# Patient Record
Sex: MALE | Race: OTHER | NOT HISPANIC OR LATINO | URBAN - METROPOLITAN AREA
[De-identification: names, ages, dates, MRNs, and addresses within clinical notes are randomized per-mention and may not be internally consistent; named-entity substitution may affect disease eponyms.]

---

## 2022-05-12 ENCOUNTER — EMERGENCY (EMERGENCY)
Facility: HOSPITAL | Age: 47
LOS: 1 days | Discharge: HOME | End: 2022-05-12
Attending: EMERGENCY MEDICINE
Payer: COMMERCIAL

## 2022-05-12 VITALS
RESPIRATION RATE: 16 BRPM | OXYGEN SATURATION: 99 % | DIASTOLIC BLOOD PRESSURE: 87 MMHG | TEMPERATURE: 99 F | SYSTOLIC BLOOD PRESSURE: 143 MMHG | HEART RATE: 87 BPM

## 2022-05-12 DIAGNOSIS — T78.2XXA ANAPHYLACTIC SHOCK, UNSPECIFIED, INITIAL ENCOUNTER: ICD-10-CM

## 2022-05-12 PROCEDURE — 96372 THER/PROPH/DIAG INJ SC/IM: CPT | Mod: XU

## 2022-05-12 PROCEDURE — 31525 DX LARYNGOSCOPY EXCL NB: CPT

## 2022-05-12 PROCEDURE — 96375 TX/PRO/DX INJ NEW DRUG ADDON: CPT

## 2022-05-12 PROCEDURE — 99291 CRITICAL CARE FIRST HOUR: CPT

## 2022-05-12 PROCEDURE — 94640 AIRWAY INHALATION TREATMENT: CPT

## 2022-05-12 PROCEDURE — 99284 EMERGENCY DEPT VISIT MOD MDM: CPT | Mod: 25

## 2022-05-12 PROCEDURE — 96374 THER/PROPH/DIAG INJ IV PUSH: CPT

## 2022-05-12 RX ORDER — ALBUTEROL 90 UG/1
2.5 AEROSOL, METERED ORAL ONCE
Refills: 0 | Status: COMPLETED | OUTPATIENT
Start: 2022-05-12 | End: 2022-05-12

## 2022-05-12 RX ORDER — DEXAMETHASONE 0.5 MG/5ML
10 ELIXIR ORAL ONCE
Refills: 0 | Status: DISCONTINUED | OUTPATIENT
Start: 2022-05-12 | End: 2022-05-12

## 2022-05-12 RX ORDER — FAMOTIDINE 10 MG/ML
20 INJECTION INTRAVENOUS ONCE
Refills: 0 | Status: COMPLETED | OUTPATIENT
Start: 2022-05-12 | End: 2022-05-12

## 2022-05-12 RX ORDER — DIPHENHYDRAMINE HCL 50 MG
50 CAPSULE ORAL ONCE
Refills: 0 | Status: COMPLETED | OUTPATIENT
Start: 2022-05-12 | End: 2022-05-12

## 2022-05-12 RX ORDER — DEXAMETHASONE 0.5 MG/5ML
10 ELIXIR ORAL ONCE
Refills: 0 | Status: COMPLETED | OUTPATIENT
Start: 2022-05-12 | End: 2022-05-12

## 2022-05-12 RX ORDER — EPINEPHRINE 0.3 MG/.3ML
0.3 INJECTION INTRAMUSCULAR; SUBCUTANEOUS ONCE
Refills: 0 | Status: COMPLETED | OUTPATIENT
Start: 2022-05-12 | End: 2022-05-12

## 2022-05-12 RX ORDER — SODIUM CHLORIDE 9 MG/ML
1000 INJECTION INTRAMUSCULAR; INTRAVENOUS; SUBCUTANEOUS ONCE
Refills: 0 | Status: COMPLETED | OUTPATIENT
Start: 2022-05-12 | End: 2022-05-12

## 2022-05-12 RX ADMIN — Medication 102 MILLIGRAM(S): at 22:11

## 2022-05-12 RX ADMIN — Medication 50 MILLIGRAM(S): at 22:12

## 2022-05-12 RX ADMIN — FAMOTIDINE 20 MILLIGRAM(S): 10 INJECTION INTRAVENOUS at 22:11

## 2022-05-12 RX ADMIN — EPINEPHRINE 0.3 MILLIGRAM(S): 0.3 INJECTION INTRAMUSCULAR; SUBCUTANEOUS at 22:12

## 2022-05-12 RX ADMIN — SODIUM CHLORIDE 2000 MILLILITER(S): 9 INJECTION INTRAMUSCULAR; INTRAVENOUS; SUBCUTANEOUS at 22:15

## 2022-05-12 RX ADMIN — ALBUTEROL 2.5 MILLIGRAM(S): 90 AEROSOL, METERED ORAL at 22:16

## 2022-05-12 NOTE — ED ADULT TRIAGE NOTE - CHIEF COMPLAINT QUOTE
ate tree nuts and eyes are swollen this happened at 1pm no known allergies, took benadryl at 230, no diff breathing

## 2022-05-12 NOTE — ED PROVIDER NOTE - PATIENT PORTAL LINK FT
You can access the FollowMyHealth Patient Portal offered by [facility] by registering at the following website: [email or web address] By joining [facility]’s FollowMyHealth portal, you will also be able to view your health information using other applications (apps) compatible with our system.

## 2022-05-12 NOTE — ED PROVIDER NOTE - OBJECTIVE STATEMENT
46y M with no pertinent PMHx presents to the ED c/o allergic reaction a/w swollen eyes, rash, itching, and difficulty breathing s/p eating tree nuts. Pt said that the symptoms came suddenly, but worsened one hour after. Pt was drinking a homemade drink while eating tree nuts. Pt took benadryl at 1430 with some relief of rash, itching, difficulty breathing. Pt takes 3 allergy shots every 2 weeks. Denies similar event in the past despite eating nuts in the past. Denies CP, cough, abd pain. Denies hematochezia and melena. Denies trouble walking. Denies paresthesias. 46y M with no pertinent PMHx presents to the ED c/o allergic reaction a/w swollen eyes, diffuse urticarial rash, itching, throat tightness and difficulty breathing/wheezing s/p eating tree nuts. Pt said that the symptoms came suddenly at ~1pm today, drinking a homemade drink while eating tree nuts, but worsened one hour after around 2pm. Pt took benadryl at 1430 with some relief of rash, itching, difficulty breathing. Did have abdominal cramping pain and 3 episodes of diarrhea. Pt takes 3 allergy shots every 2 weeks for environmental allergies. Denies similar event in the past despite eating nuts in the past. Denies CP, cough, abd pain. Denies hematochezia and melena. Denies trouble walking. Denies paresthesias.

## 2022-05-12 NOTE — ED PROVIDER NOTE - NSFOLLOWUPINSTRUCTIONS_ED_ALL_ED_FT
[id number]  *More detailed information regarding your visit and discharge can be found by reviewing this packet.*  -----------------------------    Your diagnosis this visit was: anaphylaxis (severe allergic reaction)     Continue to take benadryl 50mg every 6 to 8 hours as needed for itching, rash or swelling. You can take Pepcid 20mg up to every 6 hours for additional relief of allergic symptoms.     From this ED visit you were prescribed: epi pen to be taken if you experience any oral swelling, trouble breathing, vomiting or abd pain associated with an allergic reaction. If you administer your epi pen you should come immediately to the ER.     We recommend you follow up with: your allergist in 1-3 days.     Please return to the Emergency Department if you experience any of the following symptoms:  - Shortness of breath or trouble breathing  - Pressure, pain, or tightness in the chest  - facial swelling, oral swelling, vomiting  - Face drooping, arm weakness or speech difficulty,  - Persistent or severe vomiting  - Head injury or loss of consciousness  - Nonstop bleeding or an open wound

## 2022-05-12 NOTE — CONSULT NOTE ADULT - SUBJECTIVE AND OBJECTIVE BOX
CC: Anaphylaxis    HPI: 46y M with no pertinent PMHx presents to the ED c/o allergic reaction a/w swollen eyes, diffuse urticarial rash, itching, throat tightness and difficulty breathing/wheezing s/p eating tree nuts. Pt said that the symptoms came suddenly at ~1pm today, drinking a homemade drink while eating tree nuts, but worsened one hour after around 2pm. Pt took benadryl at 1430 with some relief of rash, itching, difficulty breathing. Did have abdominal cramping pain and 3 episodes of diarrhea. Pt takes 3 allergy shots every 2 weeks for environmental allergies. Denies similar event in the past despite eating nuts in the past. Denies CP, cough, abd pain. Denies hematochezia and melena. Denies trouble walking. Denies paresthesias.        PAST MEDICAL & SURGICAL HISTORY:  No pertinent past medical history      No significant past surgical history        Allergies    dust (Unknown)  No Known Drug Allergies  pollen, grass (Unknown)    Intolerances      MEDICATIONS  (STANDING):    MEDICATIONS  (PRN):      Social History: nonsmoker     Family history: no pertinent family history     ROS:   ENT: all negative except as noted in HPI   CV: denies palpitations  Pulm: denies SOB, cough, hemoptysis  : denies pertinent urinary symptoms, urgency  Neuro: denies numbness/tingling, loss of sensation  Psych: denies anxiety  MS: denies muscle weakness, instability  Heme: denies easy bruising or bleeding  Endo: denies heat/cold intolerance, excessive sweating  Vascular: denies LE edema    Vital Signs Last 24 Hrs  T(C): 37.1 (12 May 2022 19:12), Max: 37.1 (12 May 2022 19:12)  T(F): 98.7 (12 May 2022 19:12), Max: 98.7 (12 May 2022 19:12)  HR: 87 (12 May 2022 19:12) (87 - 87)  BP: 143/87 (12 May 2022 19:12) (143/87 - 143/87)  BP(mean): --  RR: 16 (12 May 2022 19:12) (16 - 16)  SpO2: 99% (12 May 2022 19:12) (99% - 99%)              PHYSICAL EXAM:  Gen: NAD  Skin: No rashes, bruises, or lesions  Head: Normocephalic, Atraumatic  Face: no edema, erythema, or fluctuance. Parotid glands soft without mass  Eyes:  periorbital edema BL lids  Nose: Nares bilaterally patent, no discharge  Mouth: No Stridor / Drooling / Trismus.  Mucosa moist  Neck: Flat, supple, no lymphadenopathy, trachea midline, no masses  Lymphatic: No lymphadenopathy  Resp: breathing easily, no stridor  CV: no peripheral edema/cyanosis  GI: nondistended   Peripheral vascular: no JVD or edema  Neuro: facial nerve intact, no facial droop        Fiberoptic Indirect laryngoscopy:  (Scope #2 used)  Reason for Laryngoscopy: upper airway eval     Nasopharynx, oropharynx, and hypopharynx clear, no bleeding. Tongue base, posterior pharyngeal wall, vallecula, epiglottis, and subglottis appear normal. No erythema, edema, pooling of secretions, masses or lesions. Airway patent, no foreign body visualized. No glottic/supraglottic edema. True vocal cords, arytenoids, vestibular folds, ventricles, pyriform sinuses, and aryepiglottic folds appear normal bilaterally. Vocal cords mobile with good contact b/l.

## 2022-05-12 NOTE — ED PROVIDER NOTE - PROGRESS NOTE DETAILS
Dr. Otoole:  Pt reporst sx improvement. Will cont to monitor. Pt would like to be DC. States feels improved. Clinically dec facial swelling, scoped by ENT w patent upper airway, lungs cta. Sending epi pen to pharmacy. Advised to LYDIA curtis his allergist. Made aware of potential for delayed phase anaphylaxis and reviewed what to monitor for.

## 2022-05-12 NOTE — ED PROVIDER NOTE - CLINICAL SUMMARY MEDICAL DECISION MAKING FREE TEXT BOX
46M hx of environmental allergies here with allergic reaction c/w anaphylaxis w facial swelling, oropharyngeal swelling, wheezing, abd cramps and diarrhea. Will tx as such.  Place on tele. Observe for 4-6 hours. ENT to see.

## 2022-05-12 NOTE — ED ADULT NURSE NOTE - OBJECTIVE STATEMENT
46y male A&Ox4  pmhx of deviated septum, seasonal allergies gets allergie shots [id number]. pt states he was already having his normal seasonal allergies this morning due to being outside and was experiencing stuffy nose, watery eyes, difficulty breathing and this afternoon around 1pm he ate tree nuts and by 2 pm his eyes became very swollen, at that point it was even more difficult to breath, and he developed a generalized rash/ichyness so he ended up taking 2x25mg tablets of benadryl at 3pm. currently his breathing has improved, rash/itchyness has gone away, but he continues to suffer with swollen eyes and nasal congestion. denies SOB, chest pain, dizziness, headache, NV, abd pain, urinary symptoms, AMS. pt states he  had 3episodes of diarrhea after taking benadryl.

## 2022-05-12 NOTE — CONSULT NOTE ADULT - ASSESSMENT
47 y/o M c/o allergic reaction a/w swollen eyes, diffuse urticarial rash, itching, throat tightness and difficulty breathing/wheezing s/p eating tree nuts. ENT consulted for upper airway evaluation. Laryngoscopy done at bedside, airway patent. No edema noted.

## 2022-05-12 NOTE — ED PROVIDER NOTE - PHYSICAL EXAMINATION
Gen: WNWD NAD  HEENT: NCAT PERRL EOMI periorbital edema BL lids pharyngeal erythema and edema to uvula and tonsils   Neck: supple no LAD   CV: RRR, no murmur  Lung: CTA BL +wheezing bl bases L> R   Abd: +BS soft NTND  Skin: warm dry intact no rashes  Ext: wwp, palp pulses, FROMx4, no cce  Neuro: A&Ox3, CN grossly intact, sensation intact, motor 5/5 throughout

## 2022-05-13 VITALS
SYSTOLIC BLOOD PRESSURE: 136 MMHG | OXYGEN SATURATION: 98 % | HEART RATE: 75 BPM | TEMPERATURE: 98 F | DIASTOLIC BLOOD PRESSURE: 82 MMHG | RESPIRATION RATE: 16 BRPM

## 2022-05-13 RX ORDER — EPINEPHRINE 0.3 MG/.3ML
0.3 INJECTION INTRAMUSCULAR; SUBCUTANEOUS
Qty: 1 | Refills: 1
Start: 2022-05-13

## 2022-05-13 RX ORDER — ALBUTEROL 90 UG/1
2 AEROSOL, METERED ORAL
Qty: 1 | Refills: 0
Start: 2022-05-13

## 2025-04-21 ENCOUNTER — INPATIENT (INPATIENT)
Facility: HOSPITAL | Age: 50
LOS: 2 days | Discharge: HOME | End: 2025-04-24
Attending: STUDENT IN AN ORGANIZED HEALTH CARE EDUCATION/TRAINING PROGRAM | Admitting: INTERNAL MEDICINE
Payer: COMMERCIAL

## 2025-04-21 VITALS
DIASTOLIC BLOOD PRESSURE: 117 MMHG | RESPIRATION RATE: 19 BRPM | HEART RATE: 88 BPM | SYSTOLIC BLOOD PRESSURE: 208 MMHG | TEMPERATURE: 98 F | OXYGEN SATURATION: 96 %

## 2025-04-21 LAB
APTT BLD: 31.2 SEC — SIGNIFICANT CHANGE UP (ref 24.5–35.6)
BASOPHILS # BLD AUTO: 0.08 K/UL — SIGNIFICANT CHANGE UP (ref 0–0.2)
BASOPHILS NFR BLD AUTO: 1.1 % — SIGNIFICANT CHANGE UP (ref 0–2)
BLOOD GAS VENOUS COMPREHENSIVE RESULT: SIGNIFICANT CHANGE UP
EOSINOPHIL # BLD AUTO: 0.53 K/UL — HIGH (ref 0–0.5)
EOSINOPHIL NFR BLD AUTO: 7.6 % — HIGH (ref 0–6)
HCT VFR BLD CALC: 37.5 % — LOW (ref 39–50)
HGB BLD-MCNC: 12.6 G/DL — LOW (ref 13–17)
IANC: 2.83 K/UL — SIGNIFICANT CHANGE UP (ref 1.8–7.4)
IMM GRANULOCYTES NFR BLD AUTO: 0.1 % — SIGNIFICANT CHANGE UP (ref 0–0.9)
INR BLD: 0.94 RATIO — SIGNIFICANT CHANGE UP (ref 0.85–1.16)
LYMPHOCYTES # BLD AUTO: 2.87 K/UL — SIGNIFICANT CHANGE UP (ref 1–3.3)
LYMPHOCYTES # BLD AUTO: 41.2 % — SIGNIFICANT CHANGE UP (ref 13–44)
MCHC RBC-ENTMCNC: 25.5 PG — LOW (ref 27–34)
MCHC RBC-ENTMCNC: 33.6 G/DL — SIGNIFICANT CHANGE UP (ref 32–36)
MCV RBC AUTO: 75.8 FL — LOW (ref 80–100)
MONOCYTES # BLD AUTO: 0.64 K/UL — SIGNIFICANT CHANGE UP (ref 0–0.9)
MONOCYTES NFR BLD AUTO: 9.2 % — SIGNIFICANT CHANGE UP (ref 2–14)
NEUTROPHILS # BLD AUTO: 2.83 K/UL — SIGNIFICANT CHANGE UP (ref 1.8–7.4)
NEUTROPHILS NFR BLD AUTO: 40.8 % — LOW (ref 43–77)
NRBC # BLD AUTO: 0 K/UL — SIGNIFICANT CHANGE UP (ref 0–0)
NRBC # FLD: 0 K/UL — SIGNIFICANT CHANGE UP (ref 0–0)
NRBC BLD AUTO-RTO: 0 /100 WBCS — SIGNIFICANT CHANGE UP (ref 0–0)
PLATELET # BLD AUTO: 180 K/UL — SIGNIFICANT CHANGE UP (ref 150–400)
PROTHROM AB SERPL-ACNC: 10.9 SEC — SIGNIFICANT CHANGE UP (ref 9.9–13.4)
RBC # BLD: 4.95 M/UL — SIGNIFICANT CHANGE UP (ref 4.2–5.8)
RBC # FLD: 13.7 % — SIGNIFICANT CHANGE UP (ref 10.3–14.5)
WBC # BLD: 6.96 K/UL — SIGNIFICANT CHANGE UP (ref 3.8–10.5)
WBC # FLD AUTO: 6.96 K/UL — SIGNIFICANT CHANGE UP (ref 3.8–10.5)

## 2025-04-21 PROCEDURE — 70496 CT ANGIOGRAPHY HEAD: CPT | Mod: 26

## 2025-04-21 PROCEDURE — 70498 CT ANGIOGRAPHY NECK: CPT | Mod: 26

## 2025-04-21 PROCEDURE — 99291 CRITICAL CARE FIRST HOUR: CPT

## 2025-04-21 PROCEDURE — 70450 CT HEAD/BRAIN W/O DYE: CPT | Mod: 26,59

## 2025-04-21 PROCEDURE — 0042T: CPT

## 2025-04-21 RX ORDER — LABETALOL HYDROCHLORIDE 200 MG/1
10 TABLET, FILM COATED ORAL ONCE
Refills: 0 | Status: COMPLETED | OUTPATIENT
Start: 2025-04-21 | End: 2025-04-21

## 2025-04-21 RX ORDER — TENECTEPLASE 50 MG
23 KIT INTRAVENOUS ONCE
Refills: 0 | Status: COMPLETED | OUTPATIENT
Start: 2025-04-21 | End: 2025-04-21

## 2025-04-21 RX ADMIN — LABETALOL HYDROCHLORIDE 10 MILLIGRAM(S): 200 TABLET, FILM COATED ORAL at 23:24

## 2025-04-21 RX ADMIN — Medication 10 MILLILITER(S): at 23:00

## 2025-04-21 RX ADMIN — LABETALOL HYDROCHLORIDE 10 MILLIGRAM(S): 200 TABLET, FILM COATED ORAL at 23:00

## 2025-04-21 RX ADMIN — TENECTEPLASE 3312 MILLIGRAM(S): KIT at 23:43

## 2025-04-21 NOTE — ED PROVIDER NOTE - PROGRESS NOTE DETAILS
MD Cara: Pt's BP improved to <185/110 after 10 mg labetalol IVP x 2. Cardene drip also started. Pt given TNK at 11:43 PM. Called MICU for admission

## 2025-04-21 NOTE — STROKE CODE NOTE - NIH STROKE SCALE: 1C. LOC COMMANDS, QM
Duplicate request.    Joel Carrington RN....1/13/2021 11:42 AM     
(0) Performs both tasks correctly

## 2025-04-21 NOTE — ED PROVIDER NOTE - OBJECTIVE STATEMENT
50 yo M with no known past medical history who presents to the ED c/o L arm/leg weakness and numbness. Symptoms initially began around 6 PM this evening, and then resolved around 8 pm. At 9:30 PM the symptoms suddenly returned and pt was unable to ambulate secondary to weakness in the L leg so wife called EMS. Pt denies headache, neck pain/stiffness, visual changes, chest pain, SOB, difficulty speaking.

## 2025-04-21 NOTE — ED ADULT TRIAGE NOTE - CHIEF COMPLAINT QUOTE
code stroke notification. Left sided weakness and numbness since 6pm. Left arm and leg drift noted. Blood pressure elevated. denies pmhx

## 2025-04-21 NOTE — ED PROVIDER NOTE - CLINICAL SUMMARY MEDICAL DECISION MAKING FREE TEXT BOX
48 yo M with no known past medical history who presents to the ED with L arm/leg weakness and numbness that initially began around 6 PM this evening, resolved, and then returned around 9:30 PM. On exam in the ED pt with L sided pronator drift, and difficulty with finger to nose on the L. Code stroke was called from triage, pt taken directly to CT scan and pt seen by me and neurology outside of CT. Presentation concerning for stroke. BP is elevated which is the pt's only contraindication to TNK. Will treat BP with labetalol and give TNK if BP improves to <185/115.

## 2025-04-22 PROBLEM — Z78.9 OTHER SPECIFIED HEALTH STATUS: Chronic | Status: ACTIVE | Noted: 2022-05-12

## 2025-04-22 LAB
A1C WITH ESTIMATED AVERAGE GLUCOSE RESULT: 6.3 % — HIGH (ref 4–5.6)
ALBUMIN SERPL ELPH-MCNC: 4.2 G/DL — SIGNIFICANT CHANGE UP (ref 3.3–5)
ALBUMIN SERPL ELPH-MCNC: 4.3 G/DL — SIGNIFICANT CHANGE UP (ref 3.3–5)
ALP SERPL-CCNC: 71 U/L — SIGNIFICANT CHANGE UP (ref 40–120)
ALP SERPL-CCNC: 77 U/L — SIGNIFICANT CHANGE UP (ref 40–120)
ALT FLD-CCNC: 24 U/L — SIGNIFICANT CHANGE UP (ref 4–41)
ALT FLD-CCNC: 27 U/L — SIGNIFICANT CHANGE UP (ref 4–41)
ANION GAP SERPL CALC-SCNC: 13 MMOL/L — SIGNIFICANT CHANGE UP (ref 7–14)
ANION GAP SERPL CALC-SCNC: 14 MMOL/L — SIGNIFICANT CHANGE UP (ref 7–14)
AST SERPL-CCNC: 24 U/L — SIGNIFICANT CHANGE UP (ref 4–40)
AST SERPL-CCNC: 24 U/L — SIGNIFICANT CHANGE UP (ref 4–40)
BASOPHILS # BLD AUTO: 0.08 K/UL — SIGNIFICANT CHANGE UP (ref 0–0.2)
BASOPHILS NFR BLD AUTO: 0.9 % — SIGNIFICANT CHANGE UP (ref 0–2)
BILIRUB SERPL-MCNC: 0.4 MG/DL — SIGNIFICANT CHANGE UP (ref 0.2–1.2)
BILIRUB SERPL-MCNC: 0.4 MG/DL — SIGNIFICANT CHANGE UP (ref 0.2–1.2)
BUN SERPL-MCNC: 14 MG/DL — SIGNIFICANT CHANGE UP (ref 7–23)
BUN SERPL-MCNC: 18 MG/DL — SIGNIFICANT CHANGE UP (ref 7–23)
CALCIUM SERPL-MCNC: 9.2 MG/DL — SIGNIFICANT CHANGE UP (ref 8.4–10.5)
CALCIUM SERPL-MCNC: 9.4 MG/DL — SIGNIFICANT CHANGE UP (ref 8.4–10.5)
CHLORIDE SERPL-SCNC: 101 MMOL/L — SIGNIFICANT CHANGE UP (ref 98–107)
CHLORIDE SERPL-SCNC: 99 MMOL/L — SIGNIFICANT CHANGE UP (ref 98–107)
CHOLEST SERPL-MCNC: 235 MG/DL — HIGH
CO2 SERPL-SCNC: 24 MMOL/L — SIGNIFICANT CHANGE UP (ref 22–31)
CO2 SERPL-SCNC: 24 MMOL/L — SIGNIFICANT CHANGE UP (ref 22–31)
CREAT SERPL-MCNC: 0.87 MG/DL — SIGNIFICANT CHANGE UP (ref 0.5–1.3)
CREAT SERPL-MCNC: 1.15 MG/DL — SIGNIFICANT CHANGE UP (ref 0.5–1.3)
EGFR: 104 ML/MIN/1.73M2 — SIGNIFICANT CHANGE UP
EGFR: 104 ML/MIN/1.73M2 — SIGNIFICANT CHANGE UP
EGFR: 77 ML/MIN/1.73M2 — SIGNIFICANT CHANGE UP
EGFR: 77 ML/MIN/1.73M2 — SIGNIFICANT CHANGE UP
EOSINOPHIL # BLD AUTO: 0.42 K/UL — SIGNIFICANT CHANGE UP (ref 0–0.5)
EOSINOPHIL NFR BLD AUTO: 5 % — SIGNIFICANT CHANGE UP (ref 0–6)
ESTIMATED AVERAGE GLUCOSE: 134 — SIGNIFICANT CHANGE UP
GLUCOSE SERPL-MCNC: 140 MG/DL — HIGH (ref 70–99)
GLUCOSE SERPL-MCNC: 146 MG/DL — HIGH (ref 70–99)
HCT VFR BLD CALC: 38.5 % — LOW (ref 39–50)
HDLC SERPL-MCNC: 39 MG/DL — LOW
HGB BLD-MCNC: 12.8 G/DL — LOW (ref 13–17)
IANC: 5.45 K/UL — SIGNIFICANT CHANGE UP (ref 1.8–7.4)
IMM GRANULOCYTES NFR BLD AUTO: 0.4 % — SIGNIFICANT CHANGE UP (ref 0–0.9)
LDLC SERPL-MCNC: 168 MG/DL — HIGH
LIPID PNL WITH DIRECT LDL SERPL: 168 MG/DL — HIGH
LYMPHOCYTES # BLD AUTO: 2.03 K/UL — SIGNIFICANT CHANGE UP (ref 1–3.3)
LYMPHOCYTES # BLD AUTO: 23.9 % — SIGNIFICANT CHANGE UP (ref 13–44)
MAGNESIUM SERPL-MCNC: 2.1 MG/DL — SIGNIFICANT CHANGE UP (ref 1.6–2.6)
MCHC RBC-ENTMCNC: 25.3 PG — LOW (ref 27–34)
MCHC RBC-ENTMCNC: 33.2 G/DL — SIGNIFICANT CHANGE UP (ref 32–36)
MCV RBC AUTO: 76.1 FL — LOW (ref 80–100)
MONOCYTES # BLD AUTO: 0.47 K/UL — SIGNIFICANT CHANGE UP (ref 0–0.9)
MONOCYTES NFR BLD AUTO: 5.5 % — SIGNIFICANT CHANGE UP (ref 2–14)
NEUTROPHILS # BLD AUTO: 5.45 K/UL — SIGNIFICANT CHANGE UP (ref 1.8–7.4)
NEUTROPHILS NFR BLD AUTO: 64.3 % — SIGNIFICANT CHANGE UP (ref 43–77)
NONHDLC SERPL-MCNC: 196 MG/DL — HIGH
NRBC # BLD AUTO: 0 K/UL — SIGNIFICANT CHANGE UP (ref 0–0)
NRBC # FLD: 0 K/UL — SIGNIFICANT CHANGE UP (ref 0–0)
NRBC BLD AUTO-RTO: 0 /100 WBCS — SIGNIFICANT CHANGE UP (ref 0–0)
PHOSPHATE SERPL-MCNC: 3.6 MG/DL — SIGNIFICANT CHANGE UP (ref 2.5–4.5)
PLATELET # BLD AUTO: 177 K/UL — SIGNIFICANT CHANGE UP (ref 150–400)
POTASSIUM SERPL-MCNC: 3.8 MMOL/L — SIGNIFICANT CHANGE UP (ref 3.5–5.3)
POTASSIUM SERPL-MCNC: 3.9 MMOL/L — SIGNIFICANT CHANGE UP (ref 3.5–5.3)
POTASSIUM SERPL-SCNC: 3.8 MMOL/L — SIGNIFICANT CHANGE UP (ref 3.5–5.3)
POTASSIUM SERPL-SCNC: 3.9 MMOL/L — SIGNIFICANT CHANGE UP (ref 3.5–5.3)
PROT SERPL-MCNC: 6.9 G/DL — SIGNIFICANT CHANGE UP (ref 6–8.3)
PROT SERPL-MCNC: 7.1 G/DL — SIGNIFICANT CHANGE UP (ref 6–8.3)
RBC # BLD: 5.06 M/UL — SIGNIFICANT CHANGE UP (ref 4.2–5.8)
RBC # FLD: 13.8 % — SIGNIFICANT CHANGE UP (ref 10.3–14.5)
SODIUM SERPL-SCNC: 137 MMOL/L — SIGNIFICANT CHANGE UP (ref 135–145)
SODIUM SERPL-SCNC: 138 MMOL/L — SIGNIFICANT CHANGE UP (ref 135–145)
TRIGL SERPL-MCNC: 148 MG/DL — SIGNIFICANT CHANGE UP
TROPONIN T, HIGH SENSITIVITY RESULT: 7 NG/L — SIGNIFICANT CHANGE UP
WBC # BLD: 8.48 K/UL — SIGNIFICANT CHANGE UP (ref 3.8–10.5)
WBC # FLD AUTO: 8.48 K/UL — SIGNIFICANT CHANGE UP (ref 3.8–10.5)

## 2025-04-22 PROCEDURE — 99291 CRITICAL CARE FIRST HOUR: CPT | Mod: GC

## 2025-04-22 PROCEDURE — 93356 MYOCRD STRAIN IMG SPCKL TRCK: CPT

## 2025-04-22 PROCEDURE — 99292 CRITICAL CARE ADDL 30 MIN: CPT

## 2025-04-22 PROCEDURE — 93306 TTE W/DOPPLER COMPLETE: CPT | Mod: 26

## 2025-04-22 PROCEDURE — 70450 CT HEAD/BRAIN W/O DYE: CPT | Mod: 26

## 2025-04-22 RX ORDER — ATORVASTATIN CALCIUM 80 MG/1
40 TABLET, FILM COATED ORAL AT BEDTIME
Refills: 0 | Status: DISCONTINUED | OUTPATIENT
Start: 2025-04-22 | End: 2025-04-22

## 2025-04-22 RX ORDER — HEPARIN SODIUM 1000 [USP'U]/ML
5000 INJECTION INTRAVENOUS; SUBCUTANEOUS EVERY 8 HOURS
Refills: 0 | Status: DISCONTINUED | OUTPATIENT
Start: 2025-04-22 | End: 2025-04-24

## 2025-04-22 RX ORDER — ATORVASTATIN CALCIUM 80 MG/1
80 TABLET, FILM COATED ORAL AT BEDTIME
Refills: 0 | Status: DISCONTINUED | OUTPATIENT
Start: 2025-04-22 | End: 2025-04-24

## 2025-04-22 RX ORDER — ASPIRIN 325 MG
81 TABLET ORAL DAILY
Refills: 0 | Status: DISCONTINUED | OUTPATIENT
Start: 2025-04-22 | End: 2025-04-24

## 2025-04-22 RX ORDER — NICARDIPINE HCL 30 MG
5 CAPSULE ORAL
Qty: 40 | Refills: 0 | Status: DISCONTINUED | OUTPATIENT
Start: 2025-04-22 | End: 2025-04-22

## 2025-04-22 RX ORDER — INFLUENZA A VIRUS A/IDAHO/07/2018 (H1N1) ANTIGEN (MDCK CELL DERIVED, PROPIOLACTONE INACTIVATED, INFLUENZA A VIRUS A/INDIANA/08/2018 (H3N2) ANTIGEN (MDCK CELL DERIVED, PROPIOLACTONE INACTIVATED), INFLUENZA B VIRUS B/SINGAPORE/INFTT-16-0610/2016 ANTIGEN (MDCK CELL DERIVED, PROPIOLACTONE INACTIVATED), INFLUENZA B VIRUS B/IOWA/06/2017 ANTIGEN (MDCK CELL DERIVED, PROPIOLACTONE INACTIVATED) 15; 15; 15; 15 UG/.5ML; UG/.5ML; UG/.5ML; UG/.5ML
0.5 INJECTION, SUSPENSION INTRAMUSCULAR ONCE
Refills: 0 | Status: DISCONTINUED | OUTPATIENT
Start: 2025-04-22 | End: 2025-04-24

## 2025-04-22 RX ADMIN — Medication 25 MG/HR: at 00:54

## 2025-04-22 RX ADMIN — ATORVASTATIN CALCIUM 80 MILLIGRAM(S): 80 TABLET, FILM COATED ORAL at 23:00

## 2025-04-22 RX ADMIN — Medication 1 APPLICATION(S): at 11:03

## 2025-04-22 NOTE — SWALLOW BEDSIDE ASSESSMENT ADULT - SWALLOW EVAL: DIAGNOSIS
Functional oral stage for regular solids and thin liquids marked by adequate oral containment, adequate bolus manipulation and mastication with adequate anterior to posterior transfer, oral clearance noted. Functional pharyngeal stage for regular solids and thin liquids marked by initiation of the pharyngeal swallow with hyolaryngeal excursion upon palpation without evidence of impaired airway protection.

## 2025-04-22 NOTE — ED ADULT NURSE REASSESSMENT NOTE - NS ED NURSE REASSESS COMMENT FT1
Received report from SAIMA Villeda. Pt lying in stretcher, A&Ox4, family at bedside. Pt denies any complaints at this time, denies CP, SOB, weakness, numbness, and tingling. Respirations are even and unlabored, NSR on monitor, IVs are patent and intact. Sensations intact, PERRLA observed, extremity strength is equal bilaterally, capillary refill is 3 seconds bilaterally. Bed in lowest position, comfort measures provided, safety maintained. Pending MICU bed.

## 2025-04-22 NOTE — OCCUPATIONAL THERAPY INITIAL EVALUATION ADULT - GENERAL OBSERVATIONS, REHAB EVAL
Pt received supine in bed in NAD, all lines intact +tele +BP cuff +pulse ox. Pt OK to be see for OT per SAIMA Chaudhari. SpO2 98% on RA.

## 2025-04-22 NOTE — H&P ADULT - NSVTERISKREFERASSESS_GEN_ALL_CORE
Patient is in the supine position.   The body was positioned using the following devices: safety strap.  The head was positioned using the following devices: regular pillow.  The left arm was positioned using the following devices: arm board.  The right arm was positioned using the following devices: arm board.  The left leg was positioned using the following devices: safety strap.  The right leg was positioned using the following devices: safety strap.   Refer to the Assessment tab to view/cancel completed assessment.

## 2025-04-22 NOTE — PROGRESS NOTE ADULT - CRITICAL CARE ATTENDING COMMENT
48 yo M with no known past medical history who presents to the ED with L arm/leg weakness and numbness that initially began around 6 PM this evening,     Patient presented to the ED s/p code stroke, CTA/ perfusion negative but given symptoms s/p TNK last night around 11pm. Symptoms has since resolved. Passed s/s eval.    # CVA  # Left sided weakness  - Pending repeat CTH at 24 hours, MRI ordered. CTH PRN  - TTE with bubble study.  - Check A1c, Lipids  - Start high intensity statins  - ASA if repeat CTH at 24 hours is without complications  - PT/OT  - Passed dysphagia screening  - Permissive hypertension, Antihypertensive as needed  - Monitor for post TNK bleeding. angioedema  - Q1H neuro check  - f/u with neuro recs  - Patient states he will likely not tolerate and MRI, is requesting open MRI.   - DVT ppx- SCD  - Dispo- full code.

## 2025-04-22 NOTE — CONSULT NOTE ADULT - SUBJECTIVE AND OBJECTIVE BOX
**STROKE CODE CONSULT NOTE**    Last known well time/Time of onset of symptoms:    HPI:    PAST MEDICAL & SURGICAL HISTORY:  No pertinent past medical history      No significant past surgical history          FAMILY HISTORY:      SOCIAL HISTORY:  Smoking Cessation: Discussed    ROS:  Constitutional: No fever, weight loss or fatigue  Eyes: No eye pain, visual disturbances, or discharge  ENMT:  No difficulty hearing, tinnitus, vertigo; No sinus or throat pain  Neck: No pain or stiffness  Respiratory: No cough, wheezing, chills or hemoptysis  Cardiovascular: No chest pain, palpitations, shortness of breath, dizziness or leg swelling  Gastrointestinal: No abdominal pain. No nausea, vomiting or hematemesis; No diarrhea or constipation. Nohematochezia.  Genitourinary: No dysuria, frequency, hematuria or incontinence  Neurological: As per HPI  Skin: No itching, burning, rashes or lesions   Endocrine: No heat or cold intolerance; No hair loss  Musculoskeletal: No joint pain or swelling; No muscle, back or extremity pain  Psychiatric: No depression, anxiety, mood swings or difficulty sleeping  Heme/Lymph: No easy bruising or bleeding gums    MEDICATIONS  (STANDING):    MEDICATIONS  (PRN):      Allergies    No Known Drug Allergies  dust (Unknown)  pollen, grass (Unknown)    Intolerances        Vital Signs Last 24 Hrs  T(C): 36.6 (21 Apr 2025 23:59), Max: 36.6 (21 Apr 2025 22:50)  T(F): 97.8 (21 Apr 2025 23:59), Max: 97.8 (21 Apr 2025 22:50)  HR: 85 (22 Apr 2025 00:15) (77 - 96)  BP: 163/96 (22 Apr 2025 00:15) (155/97 - 208/117)  BP(mean): 116 (22 Apr 2025 00:15) (116 - 117)  RR: 17 (22 Apr 2025 00:15) (15 - 22)  SpO2: 99% (22 Apr 2025 00:15) (96% - 100%)    Parameters below as of 22 Apr 2025 00:15  Patient On (Oxygen Delivery Method): room air        PHYSICAL EXAM:  Constitutional: WDWN; NAD  Cardiovascular: RRR, no appreciable murmurs; no carotid bruits  Neurologic:  Mental status: Awake, alert and oriented x3.  Recent and remote memory intact.  Naming, repetition and comprehension intact.  Attention/concentration intact.  No dysarthria, no aphasia.  Fund of knowledge appropriate.    Cranial nerves: Fundoscopic exam demonstrated no abnormalities, pupils equally round and reactive to light, visual fields full, no nystagmus, extraocular muscles intact, V1 through V3 intact bilaterally and symmetric, face symmetric, hearing intact to finger rub, palate elevation symmetric, tongue was midline, sternocleidomastoid/shoulder shrug strength bilaterally 5/5.    Motor:  Normal bulk and tone, strength 5/5 in bilateral upper and lower extremities.   strength 5/5.  Rapid alternating movements intact and symmetric.   Sensation: Intact to light touch, proprioception, and pinprick.  No neglect.   Coordination: No dysmetria on finger-to-nose and heel-to-shin.  No clumsiness.  Reflexes: 2+ in upper and lower extremities, downgoing toes bilaterally  Gait: Narrow and steady. No ataxia.  Romberg negative    NIHSS:    Fingerstick Blood Glucose: CAPILLARY BLOOD GLUCOSE      POCT Blood Glucose.: 170 mg/dL (21 Apr 2025 22:47)       LABS:                        12.6   6.96  )-----------( 180      ( 21 Apr 2025 23:00 )             37.5     04-21    137  |  99  |  18  ----------------------------<  146[H]  3.9   |  24  |  1.15    Ca    9.4      21 Apr 2025 23:00    TPro  6.9  /  Alb  4.2  /  TBili  0.4  /  DBili  x   /  AST  24  /  ALT  24  /  AlkPhos  77  04-21    PT/INR - ( 21 Apr 2025 23:00 )   PT: 10.9 sec;   INR: 0.94 ratio         PTT - ( 21 Apr 2025 23:00 )  PTT:31.2 sec      Urinalysis Basic - ( 21 Apr 2025 23:00 )    Color: x / Appearance: x / SG: x / pH: x  Gluc: 146 mg/dL / Ketone: x  / Bili: x / Urobili: x   Blood: x / Protein: x / Nitrite: x   Leuk Esterase: x / RBC: x / WBC x   Sq Epi: x / Non Sq Epi: x / Bacteria: x        RADIOLOGY & ADDITIONAL STUDIES:    IV-tenecteplase(Y/N):                                   Bolus time:  Reason IV-tenecteplase not given:   **STROKE CODE CONSULT NOTE**    Last known well time/Time of onset of symptoms: 4/21/25     HPI:    PAST MEDICAL & SURGICAL HISTORY:  No pertinent past medical history      No significant past surgical history          FAMILY HISTORY:      SOCIAL HISTORY:  Smoking Cessation: Discussed    ROS:  Constitutional: No fever, weight loss or fatigue  Eyes: No eye pain, visual disturbances, or discharge  ENMT:  No difficulty hearing, tinnitus, vertigo; No sinus or throat pain  Neck: No pain or stiffness  Respiratory: No cough, wheezing, chills or hemoptysis  Cardiovascular: No chest pain, palpitations, shortness of breath, dizziness or leg swelling  Gastrointestinal: No abdominal pain. No nausea, vomiting or hematemesis; No diarrhea or constipation. Nohematochezia.  Genitourinary: No dysuria, frequency, hematuria or incontinence  Neurological: As per HPI  Skin: No itching, burning, rashes or lesions   Endocrine: No heat or cold intolerance; No hair loss  Musculoskeletal: No joint pain or swelling; No muscle, back or extremity pain  Psychiatric: No depression, anxiety, mood swings or difficulty sleeping  Heme/Lymph: No easy bruising or bleeding gums    MEDICATIONS  (STANDING):    MEDICATIONS  (PRN):      Allergies    No Known Drug Allergies  dust (Unknown)  pollen, grass (Unknown)    Intolerances        Vital Signs Last 24 Hrs  T(C): 36.6 (21 Apr 2025 23:59), Max: 36.6 (21 Apr 2025 22:50)  T(F): 97.8 (21 Apr 2025 23:59), Max: 97.8 (21 Apr 2025 22:50)  HR: 85 (22 Apr 2025 00:15) (77 - 96)  BP: 163/96 (22 Apr 2025 00:15) (155/97 - 208/117)  BP(mean): 116 (22 Apr 2025 00:15) (116 - 117)  RR: 17 (22 Apr 2025 00:15) (15 - 22)  SpO2: 99% (22 Apr 2025 00:15) (96% - 100%)    Parameters below as of 22 Apr 2025 00:15  Patient On (Oxygen Delivery Method): room air        PHYSICAL EXAM:  Constitutional: WDWN; NAD  Cardiovascular: RRR, no appreciable murmurs; no carotid bruits  Neurologic:  Mental status: Awake, alert and oriented x3.  Recent and remote memory intact.  Naming, repetition and comprehension intact.  Attention/concentration intact.  No dysarthria, no aphasia.  Fund of knowledge appropriate.    Cranial nerves: Fundoscopic exam demonstrated no abnormalities, pupils equally round and reactive to light, visual fields full, no nystagmus, extraocular muscles intact, V1 through V3 intact bilaterally and symmetric, face symmetric, hearing intact to finger rub, palate elevation symmetric, tongue was midline, sternocleidomastoid/shoulder shrug strength bilaterally 5/5.    Motor:  Normal bulk and tone, strength 5/5 in bilateral upper and lower extremities.   strength 5/5.  Rapid alternating movements intact and symmetric.   Sensation: Intact to light touch, proprioception, and pinprick.  No neglect.   Coordination: No dysmetria on finger-to-nose and heel-to-shin.  No clumsiness.  Reflexes: 2+ in upper and lower extremities, downgoing toes bilaterally  Gait: Narrow and steady. No ataxia.  Romberg negative    NIHSS:    Fingerstick Blood Glucose: CAPILLARY BLOOD GLUCOSE      POCT Blood Glucose.: 170 mg/dL (21 Apr 2025 22:47)       LABS:                        12.6   6.96  )-----------( 180      ( 21 Apr 2025 23:00 )             37.5     04-21    137  |  99  |  18  ----------------------------<  146[H]  3.9   |  24  |  1.15    Ca    9.4      21 Apr 2025 23:00    TPro  6.9  /  Alb  4.2  /  TBili  0.4  /  DBili  x   /  AST  24  /  ALT  24  /  AlkPhos  77  04-21    PT/INR - ( 21 Apr 2025 23:00 )   PT: 10.9 sec;   INR: 0.94 ratio         PTT - ( 21 Apr 2025 23:00 )  PTT:31.2 sec      Urinalysis Basic - ( 21 Apr 2025 23:00 )    Color: x / Appearance: x / SG: x / pH: x  Gluc: 146 mg/dL / Ketone: x  / Bili: x / Urobili: x   Blood: x / Protein: x / Nitrite: x   Leuk Esterase: x / RBC: x / WBC x   Sq Epi: x / Non Sq Epi: x / Bacteria: x        RADIOLOGY & ADDITIONAL STUDIES:    IV-tenecteplase(Y/N):                                   Bolus time:  Reason IV-tenecteplase not given:   **STROKE CODE CONSULT NOTE**    Last known well time/Time of onset of symptoms: 4/21/25 2100  pre-MRS-0    HPI: This is a 49/ R. handed man, no significant pmhx presenting with acute onset L. sided weakness and difficulty walking. Patient reports experiencing transient L. sided weakness at around 6am which he had completely resolved from at 2000. at 2100 his wife saw him go upstairs and at 2130 he experienced weakness of the L. side of his body again. NIHSS was 4  ( L. UE drift, L LE drift, LUE weakness, mild L. facial) at arrival, CTH without acute changes, CTA and CTP unremarkable. BP was 203/117 at arrival, patient was a tenecteplase candidate, received tenecteplase at 2343 after risk benefit conversation was undertaken. Delay in tenecteplase 2/2 BP management to <185/110.    No known past medical history. No neurological problems in the past.     Not a thrombectomy candidate as no LVO    PAST MEDICAL & SURGICAL HISTORY:  No pertinent past medical history      No significant past surgical history          FAMILY HISTORY:      SOCIAL HISTORY:  Smoking Cessation: Discussed    MEDICATIONS  (STANDING):    MEDICATIONS  (PRN):      Allergies    No Known Drug Allergies  dust (Unknown)  pollen, grass (Unknown)    Intolerances        Vital Signs Last 24 Hrs  T(C): 36.6 (21 Apr 2025 23:59), Max: 36.6 (21 Apr 2025 22:50)  T(F): 97.8 (21 Apr 2025 23:59), Max: 97.8 (21 Apr 2025 22:50)  HR: 85 (22 Apr 2025 00:15) (77 - 96)  BP: 163/96 (22 Apr 2025 00:15) (155/97 - 208/117)  BP(mean): 116 (22 Apr 2025 00:15) (116 - 117)  RR: 17 (22 Apr 2025 00:15) (15 - 22)  SpO2: 99% (22 Apr 2025 00:15) (96% - 100%)    Parameters below as of 22 Apr 2025 00:15  Patient On (Oxygen Delivery Method): room air        PHYSICAL EXAM:  Constitutional: Comfortable at rest  Cardiovascular: RRR, no appreciable murmurs; no carotid bruits  Neurologic:  Mental status: Awake, alert and oriented x3. Naming, repetition and comprehension intact.  Attention/concentration intact.  No dysarthria, no aphasia.  Fund of knowledge appropriate.    Cranial nerves: Pupils equally round and reactive to light, visual fields full, no nystagmus, extraocular muscles intact, V1 through V3 intact bilaterally and symmetric, face with mild L.  asymmetriy, hearing intact to finger rub, palate elevation symmetric, tongue was midline, shoulder shrug strength bilaterally 5/5.    Motor:  Normal bulk and tone, strength 5/5 in right upper and lower extremities, weakness over left weakness over left deltoid and hip flexion (4/5), L pronator drift.   strength 5/5.  Rapid alternating movements slower over left.   Sensation: Intact to light touch, proprioception, and pinprick.  No neglect.   Coordination: No dysmetria on finger-to-nose and heel-to-shin over right but present over left (out of proportion to weakness).  Clumsiness over left.  Reflexes: 2+ in upper and lower extremities, downgoing toes bilaterally  Gait: Wide unsteady    NIHSS: 4    Fingerstick Blood Glucose: CAPILLARY BLOOD GLUCOSE      POCT Blood Glucose.: 170 mg/dL (21 Apr 2025 22:47)       LABS:                        12.6   6.96  )-----------( 180      ( 21 Apr 2025 23:00 )             37.5     04-21    137  |  99  |  18  ----------------------------<  146[H]  3.9   |  24  |  1.15    Ca    9.4      21 Apr 2025 23:00    TPro  6.9  /  Alb  4.2  /  TBili  0.4  /  DBili  x   /  AST  24  /  ALT  24  /  AlkPhos  77  04-21    PT/INR - ( 21 Apr 2025 23:00 )   PT: 10.9 sec;   INR: 0.94 ratio         PTT - ( 21 Apr 2025 23:00 )  PTT:31.2 sec      Urinalysis Basic - ( 21 Apr 2025 23:00 )    Color: x / Appearance: x / SG: x / pH: x  Gluc: 146 mg/dL / Ketone: x  / Bili: x / Urobili: x   Blood: x / Protein: x / Nitrite: x   Leuk Esterase: x / RBC: x / WBC x   Sq Epi: x / Non Sq Epi: x / Bacteria: x        RADIOLOGY & ADDITIONAL STUDIES:    < from: CT Brain Stroke Protocol (04.21.25 @ 23:00) >      IMPRESSION:  No evidence of acute intracranial pathology.  MRI could be considered if clinically warranted.  Left ethmoidal sinus disease.    < end of copied text >  < from: CT Angio Brain Stroke Protocol  w/ IV Cont (04.21.25 @ 23:10) >  IMPRESSION:    CT PERFUSION:  No evidence of core infarction or penumbra.    CTA NECK:  No evidence of significant stenosis or occlusion.    CTA HEAD:  No large vessel occlusion, significant stenosis or vascular abnormality   identified.    < end of copied text >      IV-tenecteplase(Y/N):                  Y                 Bolus time: 5993     **STROKE CODE CONSULT NOTE**    Last known well time/Time of onset of symptoms: 4/21/25 2100  pre-MRS-0    HPI: This is a 49/ R. handed man, no significant pmhx presenting with acute onset L. sided weakness and difficulty walking. Patient reports experiencing transient L. sided weakness at around 1800 which he had completely resolved from at 2000. at 2100 his wife saw him go upstairs and at 2130 he experienced weakness of the L. side of his body again. NIHSS was 4  (L. UE drift, L LE drift, LUE ataxia, mild L. facial) at arrival, CTH without acute changes, CTA and CTP unremarkable. BP was 203/117 at arrival, patient was a tenecteplase candidate, received tenecteplase at 2343 after risk benefit conversation was undertaken. Delay in tenecteplase 2/2 BP management to <185/110.    No known past medical history. No neurological problems in the past.     Not a thrombectomy candidate as no LVO    PAST MEDICAL & SURGICAL HISTORY:  No pertinent past medical history      No significant past surgical history          FAMILY HISTORY:      SOCIAL HISTORY:  Smoking Cessation: Discussed    MEDICATIONS  (STANDING):    MEDICATIONS  (PRN):      Allergies    No Known Drug Allergies  dust (Unknown)  pollen, grass (Unknown)    Intolerances        Vital Signs Last 24 Hrs  T(C): 36.6 (21 Apr 2025 23:59), Max: 36.6 (21 Apr 2025 22:50)  T(F): 97.8 (21 Apr 2025 23:59), Max: 97.8 (21 Apr 2025 22:50)  HR: 85 (22 Apr 2025 00:15) (77 - 96)  BP: 163/96 (22 Apr 2025 00:15) (155/97 - 208/117)  BP(mean): 116 (22 Apr 2025 00:15) (116 - 117)  RR: 17 (22 Apr 2025 00:15) (15 - 22)  SpO2: 99% (22 Apr 2025 00:15) (96% - 100%)    Parameters below as of 22 Apr 2025 00:15  Patient On (Oxygen Delivery Method): room air        PHYSICAL EXAM:  Constitutional: Comfortable at rest  Cardiovascular: RRR, no appreciable murmurs; no carotid bruits  Neurologic:  Mental status: Awake, alert and oriented x3. Naming, repetition and comprehension intact.  Attention/concentration intact.  No dysarthria, no aphasia.  Fund of knowledge appropriate.    Cranial nerves: Pupils equally round and reactive to light, visual fields full, no nystagmus, extraocular muscles intact, V1 through V3 intact bilaterally and symmetric, face with mild L.  asymmetriy, hearing intact to finger rub, palate elevation symmetric, tongue was midline, shoulder shrug strength bilaterally 5/5.    Motor:  Normal bulk and tone, strength 5/5 in right upper and lower extremities, weakness over left weakness over left deltoid and hip flexion (4/5), L pronator drift.   strength 5/5.  Rapid alternating movements slower over left.   Sensation: Intact to light touch, proprioception, and pinprick.  No neglect.   Coordination: No dysmetria on finger-to-nose and heel-to-shin over right but present over left (out of proportion to weakness).  Clumsiness over left.  Reflexes: 2+ in upper and lower extremities, downgoing toes bilaterally  Gait: Wide unsteady    NIHSS: 4    Fingerstick Blood Glucose: CAPILLARY BLOOD GLUCOSE      POCT Blood Glucose.: 170 mg/dL (21 Apr 2025 22:47)       LABS:                        12.6   6.96  )-----------( 180      ( 21 Apr 2025 23:00 )             37.5     04-21    137  |  99  |  18  ----------------------------<  146[H]  3.9   |  24  |  1.15    Ca    9.4      21 Apr 2025 23:00    TPro  6.9  /  Alb  4.2  /  TBili  0.4  /  DBili  x   /  AST  24  /  ALT  24  /  AlkPhos  77  04-21    PT/INR - ( 21 Apr 2025 23:00 )   PT: 10.9 sec;   INR: 0.94 ratio         PTT - ( 21 Apr 2025 23:00 )  PTT:31.2 sec      Urinalysis Basic - ( 21 Apr 2025 23:00 )    Color: x / Appearance: x / SG: x / pH: x  Gluc: 146 mg/dL / Ketone: x  / Bili: x / Urobili: x   Blood: x / Protein: x / Nitrite: x   Leuk Esterase: x / RBC: x / WBC x   Sq Epi: x / Non Sq Epi: x / Bacteria: x        RADIOLOGY & ADDITIONAL STUDIES:    < from: CT Brain Stroke Protocol (04.21.25 @ 23:00) >      IMPRESSION:  No evidence of acute intracranial pathology.  MRI could be considered if clinically warranted.  Left ethmoidal sinus disease.    < end of copied text >  < from: CT Angio Brain Stroke Protocol  w/ IV Cont (04.21.25 @ 23:10) >  IMPRESSION:    CT PERFUSION:  No evidence of core infarction or penumbra.    CTA NECK:  No evidence of significant stenosis or occlusion.    CTA HEAD:  No large vessel occlusion, significant stenosis or vascular abnormality   identified.    < end of copied text >      IV-tenecteplase(Y/N):                  Y                 Bolus time: 8661

## 2025-04-22 NOTE — PROGRESS NOTE ADULT - ASSESSMENT
48 yo M with no known past medical history who presents to the ED with LUE/LLE weakness and numbness c/f CVA, s/p TNK, admitted to MICU for closer monitoring and q1h neurochecks.     =====NEURO=====  #Acute CVA vs. TIA s/p TNK    CTH: no acute intracranial pathology, possible mild microangiopathic ischemic changes   CT perfusion: no evidence of core infarction or penumbra   CTA head/neck: no significant occlusion or stenosis or vascular abnormality   S/p TNK on 4/21 at 23:43  Family hx significant for CVA   - Neurochecks q1h   - NO lab draws or FS for 6hrs after TNK (OK to resume lab draws 4/22 6AM)  - Neuro consulted and following   - Cardene gtt for BP control    - Goal BP <180/105 for at least 24hrs after TNK    - Start Atorva 40 once able to take PO   - Plan to start ASA 81mg 24hrs after TNK if repeat CTH negative for hemorrhage   - Repeat CTH 24hrs after TNK administration (to be done on 4/22 at 11:30PM)   - MRI brain w/ and w/o    - TTE w/ bubble study    - Duplex B/L carotids    - Send A1C, lipid panel, urine drug screen   - Strict bedrest    - Elevate HOB 30 degrees   - NPO unless passes dysphagia screen, otherwise S/S eval   - NO hodges placement, AC or antiplatelets for 24hrs      =====PULMONARY=====  On RA, satting well. No active issues.      =====CARDIOVASCULAR=====  #HTN    - c/w Cardene gtt  - Goal BP <180/105 for at least 24hrs after TNK    - Pending TTE w/ bubble study     =====GI=====  No active issues.       =====RENAL/======   No active issues.     =====ENDOCRINE=====   No active issues. Send lipid panel, A1C in AM.       =====INFECTIOUS DISEASE=====  No active issues.      =====HEME/ONC=====  #Microcytic Anemia   Hgb 12.6 w/ MCV 75.8, no evidence of bleeding  - Send iron studies  - Monitor CBC  - Maintain active T&S, transfuse for Hgb < 7      DVT ppx: SCDs  GI ppx: None  Diet: NPO    50 yo M with no known past medical history who presents to the ED with LUE/LLE weakness and numbness c/f CVA, s/p TNK, admitted to MICU for closer monitoring and q1h neurochecks.     =====NEURO=====  #Acute CVA vs. TIA s/p TNK    -presented with LLE weakness and difficulty ambulating. Stroke code called and CTH negative S/p TNK  4/21 at 23:43  -CTH: no acute intracranial pathology, possible mild microangiopathic ischemic changes   -CT perfusion: no evidence of core infarction or penumbra   -CTA head/neck: no significant occlusion or stenosis or vascular abnormality   - Neurochecks q1h   - Neuro following   - Plan to start ASA 81mg 24hrs after TNK if repeat CTH negative for hemorrhage   -atorvastatin started  - Repeat CTH 24hrs after TNK administration (to be done on 4/22 at 11:30PM)   - MRI brain w/ and w/o patient endorsed claustrophobia neuro okay with outpatient MRI   -PT/OT in place     =====PULMONARY=====  On RA, satting well. No active issues.      =====CARDIOVASCULAR=====  #HTN    -Hypertensive in ED requiring labetalol IVPx2 currently no requiring cardene gtt  - Goal BP <180/105 for at least 24hrs after TNK    - Pending TTE w/ bubble study     =====GI=====  #Diet  -No active issues  -passed speech and swallow   -will start diet        =====RENAL/======   No active issues  -voiding without any issues     =====ENDOCRINE=====   No active issues  -A1c 6.3  -no need for sliding scale at this time      =====INFECTIOUS DISEASE=====  No active issues.      =====HEME/ONC/DVT PPX=====  #Microcytic Anemia   Hgb 12.6 w/ MCV 75.8, no evidence of bleeding  - Send iron studies  - Monitor CBC  - Maintain active T&S, transfuse for Hgb < 7    -DVT ppx: SCDs      =====GOC=====  -full code  -wife involved in care

## 2025-04-22 NOTE — PATIENT PROFILE ADULT - FALL HARM RISK - HARM RISK INTERVENTIONS

## 2025-04-22 NOTE — OCCUPATIONAL THERAPY INITIAL EVALUATION ADULT - NSOTDISCHREC_GEN_A_CORE
Recommend supervision/assist with functional activities which pt reports family can provide. Home with no OT needs. Patient appears to be at baseline for ADLs and functional mobility. Patient will not be placed on OT program. Please reconsult this discipline with any changes./No skilled OT needs

## 2025-04-22 NOTE — PHYSICAL THERAPY INITIAL EVALUATION ADULT - PERTINENT HX OF CURRENT PROBLEM, REHAB EVAL
49 year old Male with no known past medical history who presents to the ED with Left UE/Left LE weakness and numbness consistent for CVA, status post TNK, admitted to MICU for closer monitoring and q1h neurochecks.

## 2025-04-22 NOTE — H&P ADULT - NSHPLABSRESULTS_GEN_ALL_CORE
.  LABS:                         12.6   6.96  )-----------( 180      ( 21 Apr 2025 23:00 )             37.5     04-21    137  |  99  |  18  ----------------------------<  146[H]  3.9   |  24  |  1.15    Ca    9.4      21 Apr 2025 23:00    TPro  6.9  /  Alb  4.2  /  TBili  0.4  /  DBili  x   /  AST  24  /  ALT  24  /  AlkPhos  77  04-21    PT/INR - ( 21 Apr 2025 23:00 )   PT: 10.9 sec;   INR: 0.94 ratio         PTT - ( 21 Apr 2025 23:00 )  PTT:31.2 sec  Urinalysis Basic - ( 21 Apr 2025 23:00 )    Color: x / Appearance: x / SG: x / pH: x  Gluc: 146 mg/dL / Ketone: x  / Bili: x / Urobili: x   Blood: x / Protein: x / Nitrite: x   Leuk Esterase: x / RBC: x / WBC x   Sq Epi: x / Non Sq Epi: x / Bacteria: x

## 2025-04-22 NOTE — H&P ADULT - ASSESSMENT
50 yo M with no known past medical history who presents to the ED with LUE/LLE weakness and numbness c/f CVA, s/p TNK, admitted to MICU for closer monitoring and q1h neurochecks.     =====NEURO=====  #Acute CVA vs. TIA s/p TNK    CTH: no acute intracranial pathology, possible mild microangiopathic ischemic changes   CT perfusion: no evidence of core infarction or penumbra   CTA head/neck: no significant occlusion or stenosis or vascular abnormality   S/p TNK on 4/21 at 23:43  Family hx significant for CVA   - Neurochecks q1h   - NO lab draws or FS for 6hrs after TNK (OK to resume lab draws 4/22 6AM)  - Neuro consulted and following   - Cardene gtt for BP control    - Goal BP <185/110 for at least 24hrs after TNK    - Start Statin once able to take PO   - Strict bedrest    - Elevate HOB 30 degrees   - NPO unless passes dysphagia screen, otherwise S/S eval   - MRI brain w/ and w/o    - TTE w/ bubble study    - Duplex carotids      =====PULMONARY=====  On RA, satting well. No active issues.      =====CARDIOVASCULAR=====  #HTN      =====GI=====  No active issues.       =====RENAL/======   No active issues.     =====ENDOCRINE=====   No active issues. Send lipid panel, A1C in AM.       =====INFECTIOUS DISEASE=====  No active issues.      =====HEME/ONC=====  #Microcytic Anemia   Hgb 12.6 w/ MCV 75.8, no evidence of bleeding  - Send iron studies  - Monitor CBC  - Maintain active T&S, transfuse for Hgb < 7      DVT ppx:   GI ppx: None  Diet: NPO  48 yo M with no known past medical history who presents to the ED with LUE/LLE weakness and numbness c/f CVA, s/p TNK, admitted to MICU for closer monitoring and q1h neurochecks.     =====NEURO=====  #Acute CVA vs. TIA s/p TNK    CTH: no acute intracranial pathology, possible mild microangiopathic ischemic changes   CT perfusion: no evidence of core infarction or penumbra   CTA head/neck: no significant occlusion or stenosis or vascular abnormality   S/p TNK on 4/21 at 23:43  Family hx significant for CVA   - Neurochecks q1h   - NO lab draws or FS for 6hrs after TNK (OK to resume lab draws 4/22 6AM)  - Neuro consulted and following   - Cardene gtt for BP control    - Goal BP <180/105 for at least 24hrs after TNK    - Start Atorva 40 once able to take PO   - Plan to start ASA 81mg 24hrs after TNK if repeat CTH negative for hemorrhage   - Repeat CTH 24hrs after TNK administration (to be done on 4/22 at 11:30PM)   - MRI brain w/ and w/o    - TTE w/ bubble study    - Duplex B/L carotids    - Send A1C, lipid panel, urine drug screen   - Strict bedrest    - Elevate HOB 30 degrees   - NPO unless passes dysphagia screen, otherwise S/S eval   - NO hodges placement, AC or antiplatelets for 24hrs      =====PULMONARY=====  On RA, satting well. No active issues.      =====CARDIOVASCULAR=====  #HTN    - c/w Cardene gtt  - Goal BP <180/105 for at least 24hrs after TNK    - Pending TTE w/ bubble study     =====GI=====  No active issues.       =====RENAL/======   No active issues.     =====ENDOCRINE=====   No active issues. Send lipid panel, A1C in AM.       =====INFECTIOUS DISEASE=====  No active issues.      =====HEME/ONC=====  #Microcytic Anemia   Hgb 12.6 w/ MCV 75.8, no evidence of bleeding  - Send iron studies  - Monitor CBC  - Maintain active T&S, transfuse for Hgb < 7      DVT ppx: SCDs  GI ppx: None  Diet: NPO

## 2025-04-22 NOTE — H&P ADULT - HISTORY OF PRESENT ILLNESS
50 yo M with no known past medical history who presents to the ED with L arm/leg weakness and numbness that initially began around 6 PM this evening, resolved around 8PM, and then returned suddently around 9:30 PM and pt was unable to ambulate 2/2 to LLE weakness, so wife called EMS. Pt denies headache, neck pain/stiffness, visual changes, chest pain, SOB, difficulty speaking. On exam in the ED pt with L sided pronator drift, and difficulty with finger to nose on the left. Code stroke was called from triage, pt taken directly to CT scan and evaluation by Neuro outside CT. Presentation concerning for stroke. Family history significant for CVA.      CTH w/o acute pathology, CT perfusion w/o evidence of core infarction or penumbra, CTA head/neck no significant occlusion or stenosis or vascular abnormality. S/p TNK at 23:43, s/p IVP Labtalol 10 x2, now on Cardene gtt.      VS: Hypertensive up to 191/98, otherwise afebrile, HR 80s, normal RR, satting 100% on RA.    EKG:  NSR, possible LVF

## 2025-04-22 NOTE — CONSULT NOTE ADULT - ASSESSMENT
Assessment:     LKN:   NIHSS:  baseline mRS:   *Not thrombectomy candidate due to: No LVO    Impression: symptoms 2/2 occlusion    Mechanism: L. hemiparesis and ataxia likely due R. brain dysfucntion 2/2 acute ischemic stroke. Mechanism pending further workup but likely small vessel disease i/v/o uncontrolled HTN.     Plan:   [ ] Admit to MICU  [ ] Please repeat CTH in 24 hours after tenecteplase administration.      [ ] MRI w/o contrast:  [ ] TTE w/ bubble study  [ ]  Basic labs: CBC,CMP, coagulation panel and troponin,  HgbA1C, lipid panel, urine drug screen  [ ] Aspirin 81mg daily will be started 24h after tenecteplase if repeat CTH negative for hemorrhage.   [ ] Atorvastatin 40 mg daily (long-term goal and titrate to LDL < 70)  [ ]  Baseline EKG and CXR    After tenecteplase administration (Started at ------):  >Neurochecks: Every 15 mins x two  hours, then every 30 mins x6 hours, then every hour x 16 hours.   >No hodges removal or placement for 24 hours   >No venous/arterial puncture at non-compressible site   >No anticoagulation or anti-platelet agents for 24 hours   >Cardene drip as needed to keep BP < 180/105    - FOR PATIENTS WHO PASSED DYSPHAGIA SCREEN PLEASE ORDER PUREED DIET. ALL PATIENTS SHOULD HAVE SPEECH LANGUAGE EVALLUATION. IF PATIENTS npo OR FAILS DYSPHAGIA SCREEN, NEED SPEECH AND SWALLOW CONSULT.   SPEECH LANGUAGE EVALUATION AND S/S EVALUATION ARE TWO SEPARATE ORDERS        -Continuous cardiac telemetry to monitor for arrhythmia   - Frequent neuro-checks (q15min for 4h then q1h for 24h then q4h)   - Permissive HTN up to /105 for 48 hours then gradual normotension over 2-3 days.    - Intravenous hydration with normal saline at 75cc per hour   - NPO unless passess dysphagia screen. If fails, perform SLP eval    - Head of bed > 30 degrees for aspiration prevention and aspiration precautions ordered.   -Tight glucose control (long-term goal HgbA1c < 6%)   -Stroke education and counseling   -PT/OT/ SLP consults   -TAMMIE / PAM for help with discharge when stable   -Prophylaxis: SCDs (DVT), will start heparin 24h after tenecteplase if repeat CTH negative for hemorrhage,     Tenecteplase accept consent: The risks and benefits of IV Tenecteplase administration was discussed with patient/family in presence of ED staff. Risks including but not limited to intracranial hemorrhage, major systemic hemorrhage and angioedema in ~6%, 2%, and 5% of patients, respectively. There is a 2.8% risk of intracerebral bleeding in patients treated in the 3-4.5 hour window (compared to 0.2% not treated with Tenecteplase ).  In addition, contraindications to Tenecteplase were reviewed with patient and confirmed to not be present, allowing for administration of Tenecteplase .     Tenecteplase refusal: Risks, benefits, and adverse reactions associated with Tenecteplase  including hemorrhagic stroke were discussed with patient and family members in detail. Patient and family members adamantly refused Tenecteplase , even though treatment with Tenecteplase was strongly recommended as benefits are thought to outweigh potential risks    Patient was discussed with the stroke fellow ( ) and under supervision with attending ( ), will be formally staffed in morning rounds. Recommendations finalized once signed by attending       Assessment:   This is a 49/ R. handed man, no significant pmhx presenting with acute onset L. sided weakness and difficulty walking. Patient reports experiencing transient L. sided weakness at around 6am which he had completely resolved from at 2000. at 2100 his wife saw him go upstairs and at 2130 he experienced weakness of the L. side of his body again. NIHSS was 4  ( L. UE drift, L LE drift, LUE weakness, mild L. facial) at arrival, CTH without acute changes, CTA and CTP unremarkable. BP was 203/117 at arrival, patient was a tenecteplase candidate, received tenecteplase at 2343 after risk benefit conversation was undertaken. Delay in tenecteplase 2/2 BP management to <185/110. Physical exam with mild L. weakness and ataxia out of proportion to weakness        LKN: 4/21/25 2100  NIHSS: 4  baseline mRS: 0  *Not thrombectomy candidate due to: No LVO    Impression:L. hemiparesis and ataxia likely due R. brain dysfunction 2/2 acute ischemic stroke. Mechanism pending further workup but likely small vessel disease i/v/o uncontrolled HTN.     Plan:   [ ] Admit to MICU  [ ] Please repeat CTH in 24 hours after tenecteplase administration.      [ ] MRI w/o contrast  [ ] TTE w/ bubble study  [ ]  Basic labs: CBC,CMP, coagulation panel and troponin,  HbA1C, lipid panel, urine drug screen  [ ] Aspirin 81mg daily will be started 24h after tenecteplase if repeat CTH negative for hemorrhage.   [ ] Atorvastatin 80 mg daily (long-term goal and titrate to LDL < 70)  [ ] Baseline EKG and CXR    After tenecteplase administration:  >Neurochecks: Every 15 mins x two  hours, then every 30 mins x6 hours, then every hour x 16 hours.   >No hodges removal or placement for 24 hours   >No venous/arterial puncture at non-compressible site   >No anticoagulation or anti-platelet agents for 24 hours   >Cardene drip as needed to keep BP < 180/105       -Continuous cardiac telemetry to monitor for arrhythmia   - Frequent neuro-checks (q15min for 4h then q1h for 24h then q4h)   - Permissive HTN up to /105 for 48 hours then gradual normotension over 2-3 days.    - Intravenous hydration with normal saline at 75cc per hour   - NPO unless passess dysphagia screen. If fails, perform SLP eval    - Head of bed > 30 degrees for aspiration prevention and aspiration precautions ordered.   -Tight glucose control (long-term goal HgbA1c < 6%)   -Stroke education and counseling   -PT/OT/ SLP consults   -TAMMIE / PAM for help with discharge when stable   -Prophylaxis: SCDs (DVT), will start heparin 24h after tenecteplase if repeat CTH negative for hemorrhage,     Tenecteplase accept consent: The risks and benefits of IV Tenecteplase administration was discussed with patient/family in presence of ED staff. Risks including but not limited to intracranial hemorrhage, major systemic hemorrhage and angioedema in ~6%, 2%, and 5% of patients, respectively. There is a 2.8% risk of intracerebral bleeding in patients treated in the 3-4.5 hour window (compared to 0.2% not treated with Tenecteplase ).  In addition, contraindications to Tenecteplase were reviewed with patient and confirmed to not be present, allowing for administration of Tenecteplase .     Patient was discussed with with attending Jamar Arreola, will be formally staffed in morning rounds. Recommendations finalized once signed by attending       Assessment:   This is a 49/ R. handed man, no significant pmhx presenting with acute onset L. sided weakness and difficulty walking. Patient reports experiencing transient L. sided weakness at around 6am which he had completely resolved from at 2000. at 2100 his wife saw him go upstairs and at 2130 he experienced weakness of the L. side of his body again. NIHSS was 4  ( L. UE drift, L LE drift, LUE weakness, mild L. facial) at arrival, CTH without acute changes, CTA and CTP unremarkable. BP was 203/117 at arrival, patient was a tenecteplase candidate, received tenecteplase at 2343 after risk benefit conversation was undertaken. Delay in tenecteplase 2/2 BP management to <185/110. Physical exam with mild L. weakness and ataxia out of proportion to weakness        LKN: 4/21/25 2100  NIHSS: 4  baseline mRS: 0  *Not thrombectomy candidate due to: No LVO    Impression:L. hemiparesis and ataxia likely due R. brain dysfunction 2/2 acute ischemic stroke s/p TNK administartion. Mechanism pending further workup but likely small vessel disease i/v/o uncontrolled HTN.     Plan:   [ ] Admit to MICU  [ ] Please repeat CTH in 24 hours after tenecteplase administration.      [ ] MRI w/o contrast  [ ] TTE w/ bubble study  [ ]  Basic labs: CBC,CMP, coagulation panel and troponin,  HbA1C, lipid panel, urine drug screen  [ ] Aspirin 81mg daily will be started 24h after tenecteplase if repeat CTH negative for hemorrhage.   [ ] Atorvastatin 80 mg daily (long-term goal and titrate to LDL < 70)  [ ] Baseline EKG and CXR    After tenecteplase administration:  >Neurochecks: Every 15 mins x two  hours, then every 30 mins x6 hours, then every hour x 16 hours.   >No hodges removal or placement for 24 hours   >No venous/arterial puncture at non-compressible site   >No anticoagulation or anti-platelet agents for 24 hours   >Cardene drip as needed to keep BP < 180/105       -Continuous cardiac telemetry to monitor for arrhythmia   - Frequent neuro-checks (q15min for 4h then q1h for 24h then q4h)   - Permissive HTN up to /105 for 48 hours then gradual normotension over 2-3 days.    - Intravenous hydration with normal saline at 75cc per hour   - NPO unless passess dysphagia screen. If fails, perform SLP eval    - Head of bed > 30 degrees for aspiration prevention and aspiration precautions ordered.   -Tight glucose control (long-term goal HgbA1c < 6%)   -Stroke education and counseling   -PT/OT/ SLP consults   -TAMMIE / PAM for help with discharge when stable   -Prophylaxis: SCDs (DVT), will start heparin 24h after tenecteplase if repeat CTH negative for hemorrhage,     Tenecteplase accept consent: The risks and benefits of IV Tenecteplase administration was discussed with patient/family in presence of ED staff. Risks including but not limited to intracranial hemorrhage, major systemic hemorrhage and angioedema in ~6%, 2%, and 5% of patients, respectively. There is a 2.8% risk of intracerebral bleeding in patients treated in the 3-4.5 hour window (compared to 0.2% not treated with Tenecteplase ).  In addition, contraindications to Tenecteplase were reviewed with patient and confirmed to not be present, allowing for administration of Tenecteplase .     Patient was discussed with with attending Jamar Arreola, will be formally staffed in morning rounds. Recommendations finalized once signed by attending       Assessment:   This is a 49/ R. handed man, no significant pmhx presenting with acute onset L. sided weakness and difficulty walking. Patient reports experiencing transient L. sided weakness at around 6am which he had completely resolved from at 2000. at 2100 his wife saw him go upstairs and at 2130 he experienced weakness of the L. side of his body again. NIHSS was 4  ( L. UE drift, L LE drift, LUE weakness, mild L. facial) at arrival, CTH without acute changes, CTA and CTP unremarkable. BP was 203/117 at arrival, patient was a tenecteplase candidate, received tenecteplase at 2343 after risk benefit conversation was undertaken. Delay in tenecteplase 2/2 BP management to <185/110. Physical exam with mild L. weakness and ataxia out of proportion to weakness        LKN: 4/21/25 2100  NIHSS: 4  baseline mRS: 0  *Not thrombectomy candidate due to: No LVO    Impression:L. 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 Mechanism pending further workup but likely small vessel disease i/v/o uncontrolled HTN.     Plan:   [ ] Admit to MICU  [ ] Please repeat CTH in 24 hours after tenecteplase administration.      [ ] MRI w/o contrast  [ ] TTE w/ bubble study  [ ]  Basic labs: CBC,CMP, coagulation panel and troponin,  HbA1C, lipid panel, urine drug screen  [ ] Aspirin 81mg daily will be started 24h after tenecteplase if repeat CTH negative for hemorrhage.   [ ] Atorvastatin 80 mg daily (long-term goal and titrate to LDL < 70)  [ ] Baseline EKG and CXR    After tenecteplase administration:  >Neurochecks: Every 15 mins x two  hours, then every 30 mins x6 hours, then every hour x 16 hours.   >No hodges removal or placement for 24 hours   >No venous/arterial puncture at non-compressible site   >No anticoagulation or anti-platelet agents for 24 hours   >Cardene drip as needed to keep BP < 180/105       -Continuous cardiac telemetry to monitor for arrhythmia   - Frequent neuro-checks (q15min for 4h then q1h for 24h then q4h)   - Permissive HTN up to /105 for 48 hours then gradual normotension over 2-3 days.    - Intravenous hydration with normal saline at 75cc per hour   - NPO unless passess dysphagia screen. If fails, perform SLP eval    - Head of bed > 30 degrees for aspiration prevention and aspiration precautions ordered.   -Tight glucose control (long-term goal HgbA1c < 6%)   -Stroke education and counseling   -PT/OT/ SLP consults   -TAMMIE / PAM for help with discharge when stable   -Prophylaxis: SCDs (DVT), will start heparin 24h after tenecteplase if repeat CTH negative for hemorrhage,     Tenecteplase accept consent: The risks and benefits of IV Tenecteplase administration was discussed with patient/family in presence of ED staff. Risks including but not limited to intracranial hemorrhage, major systemic hemorrhage and angioedema in ~6%, 2%, and 5% of patients, respectively. There is a 2.8% risk of intracerebral bleeding in patients treated in the 3-4.5 hour window (compared to 0.2% not treated with Tenecteplase ).  In addition, contraindications to Tenecteplase were reviewed with patient and confirmed to not be present, allowing for administration of Tenecteplase .     Patient was discussed with attending Jamar Arreola, will be formally staffed in morning rounds. Recommendations finalized once signed by attending       Assessment:   This is a 49/ R. handed man, no significant pmhx presenting with acute onset L. sided weakness and difficulty walking. Patient reports experiencing transient L. sided weakness at around 1800 which he had completely resolved from at 2000. at 2100 his wife saw him go upstairs and at 2130 he experienced weakness of the L. side of his body again. NIHSS was 4  ( L. UE drift, L LE drift, LUE weakness, mild L. facial) at arrival, CTH without acute changes, CTA and CTP unremarkable. BP was 203/117 at arrival, patient was a tenecteplase candidate, received tenecteplase at 2343 after risk benefit conversation was undertaken. Delay in tenecteplase 2/2 BP management to <185/110. Physical exam with mild L. weakness and ataxia out of proportion to weakness        LKN: 4/21/25 2100  NIHSS: 4  baseline mRS: 0  *Not thrombectomy candidate due to: No LVO    Impression: L. 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 Mechanism pending further workup but likely small vessel disease i/v/o uncontrolled HTN.     Plan:   [ ] Admit to MICU  [ ] Please repeat CTH in 24 hours after tenecteplase administration.      [ ] MRI w/o contrast  [ ] TTE w/ bubble study  [ ]  Basic labs: CBC,CMP, coagulation panel and troponin,  HbA1C, lipid panel, urine drug screen  [ ] Aspirin 81mg daily will be started 24h after tenecteplase if repeat CTH negative for hemorrhage.   [ ] Atorvastatin 80 mg daily (long-term goal and titrate to LDL < 70)  [ ] Baseline EKG and CXR    After tenecteplase administration:  >Neurochecks: Every 15 mins x two  hours, then every 30 mins x6 hours, then every hour x 16 hours.   >No hodges removal or placement for 24 hours   >No venous/arterial puncture at non-compressible site   >No anticoagulation or anti-platelet agents for 24 hours   >Cardene drip as needed to keep BP < 180/105       -Continuous cardiac telemetry to monitor for arrhythmia   - Frequent neuro-checks (q15min for 4h then q1h for 24h then q4h)   - Permissive HTN up to /105 for 48 hours then gradual normotension over 2-3 days.    - Intravenous hydration with normal saline at 75cc per hour   - NPO unless passess dysphagia screen. If fails, perform SLP eval    - Head of bed > 30 degrees for aspiration prevention and aspiration precautions ordered.   -Tight glucose control (long-term goal HgbA1c < 6%)   -Stroke education and counseling   -PT/OT/ SLP consults   -TAMMIE / PAM for help with discharge when stable   -Prophylaxis: SCDs (DVT), will start heparin 24h after tenecteplase if repeat CTH negative for hemorrhage,     Tenecteplase accept consent: The risks and benefits of IV Tenecteplase administration was discussed with patient/family in presence of ED staff. Risks including but not limited to intracranial hemorrhage, major systemic hemorrhage and angioedema in ~6%, 2%, and 5% of patients, respectively. There is a 2.8% risk of intracerebral bleeding in patients treated in the 3-4.5 hour window (compared to 0.2% not treated with Tenecteplase ).  In addition, contraindications to Tenecteplase were reviewed with patient and confirmed to not be present, allowing for administration of Tenecteplase .     Patient was discussed with attending Jamar Arreola, will be formally staffed in morning rounds. Recommendations finalized once signed by attending

## 2025-04-22 NOTE — OCCUPATIONAL THERAPY INITIAL EVALUATION ADULT - PERTINENT HX OF CURRENT PROBLEM, REHAB EVAL
As per H&P: " 49 year old R. handed man, no significant pmhx presenting with acute onset L. sided weakness and difficulty walking. Patient reports experiencing transient L. sided weakness at around 1800 which he had completely resolved from at 2000. at 2100 his wife saw him go upstairs and at 2130 he experienced weakness of the L. side of his body again. NIHSS was 4  ( L. UE drift, L LE drift, LUE weakness, mild L. facial) at arrival, CTH without acute changes, CTA and CTP unremarkable. BP was 203/117 at arrival, patient was a tenecteplase candidate, received tenecteplase at 2343 after risk benefit conversation was undertaken. Delay in tenecteplase 2/2 BP management to <185/110. Physical exam with mild L. weakness and ataxia out of proportion to weakness"     Of note: Pt s/p TNK  4/21 at 23:43    Admit dx: Cerebral infarction, code stroke

## 2025-04-22 NOTE — PHYSICAL THERAPY INITIAL EVALUATION ADULT - ADDITIONAL COMMENTS
Patient lives with wife and family in private home, 4 steps to enter and flight to bedroom/bathroom. Patient was not using an assistive device prior to admission and was independent in ADL.

## 2025-04-22 NOTE — OCCUPATIONAL THERAPY INITIAL EVALUATION ADULT - LIVES WITH, PROFILE
Pt resides in a house with his wife and family with 4 steps to enter and a flight inside. Pt was independent with all ADLs prior to admission and ambulated without a device./children/spouse

## 2025-04-22 NOTE — CONSULT NOTE ADULT - CRITICAL CARE ATTENDING COMMENT
DOS 4/22/25    Briefly this is a 49M with no notable PMH who presented with acute onset L ataxic HP now s/p tnk  Notable delay in TNk admin due to inability to control SBP < 185.   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   pt/ot/slp  scds for now

## 2025-04-22 NOTE — H&P ADULT - ATTENDING COMMENTS
pt is a 50 yo male with no sig pmhx who presented with hx left sided leg weakness   while at the gym, he went home, reported  improvement of symptoms, one hour later  had return of left leg weakness and dysarthria , pt had ct scan and ct angio  without any occlusion or stenosis,   pt given tnk in the er, currently reports improvement  of symptoms.  Pe bp 190/91 rr 18 heent no jvd, lungs clear heart regular abdomen nontender  ext motor  5/5 upper ext, 4/5 left lower ext, 5/5 right lower ext    labs   wbc 7 hgb 12 hct 37 bicarb 24 cr 1    ct scan reviewed no acute cva or ich  ct angio no acute stenosis    A/p  50 yo male with acute cva, with left side deficit  s/p tnk, will admit for close neuro monitoring and  hemodynamic monitoring bp control  -neuro stroke evaluation and follow up  -repeat ct scan in am  -echo to evaluate lv function  -monitor urine output  -continue neuro checks q 1 hrs  critically ill with acute cva

## 2025-04-22 NOTE — CHART NOTE - NSCHARTNOTEFT_GEN_A_CORE
MICU Transfer Note  ---------------------------    Transfer from: MICU  Transfer to:  (  ) Medicine    ( x ) Telemetry   (  ) RCU    (  ) Palliative    (  ) Stroke Unit    (  ) _______________  Accepting Physician:      MICU COURSE    48 y/o M with no known past medical history who presents to the ED with L arm/leg weakness and numbness that initially began around 6 PM this evening, resolved around 8PM, and then returned suddenly around 9:30 PM and pt was unable to ambulate 2/2 to LLE weakness, so wife called EMS. Pt denies headache, neck pain/stiffness, visual changes, chest pain, SOB, difficulty speaking. On exam in the ED pt with L sided pronator drift, and difficulty with finger to nose on the left. Code stroke was called from triage, pt taken directly to CT scan and evaluation by Neuro outside CT. Presentation concerning for stroke. Family history significant for CVA.  CTH w/o acute pathology, CT perfusion w/o evidence of core infarction or penumbra, CTA head/neck no significant occlusion or stenosis or vascular abnormality. S/p TNK at 23:43, s/p IVP Labtalol 10 x2, and briefly on cardene gtt. Patient admitted to MICU for q1h neuro checks s/p TNK administration. MICU course uncomplicated and 24h scan w/o acute intracranial hemorrhage, mass effect, or shift of the midline structures. Patient reports resolution of symptoms after TNK and there are currently no focal findings on neuro exam. Patient able to eat and tolerates PO diet. Currently saturating 100% on RA. Patient currently w/ permissive hypertension continue to monitor. Patient currently hemodynamically stable and ready for transfer to floor.          ASSESSMENT & PLAN:     50 yo M with no known past medical history who presents to the ED with LUE/LLE weakness and numbness c/f CVA, s/p TNK, admitted to MICU for closer monitoring and q1h neurochecks.     =====NEURO=====  #Acute CVA vs. TIA s/p TNK    -presented with LLE weakness and difficulty ambulating. Stroke code called and CTH negative S/p TNK  4/21 at 23:43  -CTH: no acute intracranial pathology, possible mild microangiopathic ischemic changes   -CT perfusion: no evidence of core infarction or penumbra   -CTA head/neck: no significant occlusion or stenosis or vascular abnormality   - Neurochecks q4h   - Neuro following   - Started patient on ASA 81mg and hep SQ for DVT ppx as per neuro   - atorvastatin started  - Repeat CTH 24hrs after TNK administration negative  - MRI brain w/ and w/o patient endorsed claustrophobia neuro okay with outpatient MRI   - PT/OT in place     =====PULMONARY=====  On RA, satting well. No active issues.      =====CARDIOVASCULAR=====  #HTN    -Hypertensive in ED requiring labetalol IVPx2 currently no requiring cardene gtt  - Goal BP <180/105 for at least 24hrs after TNK    - Pending TTE w/ bubble study     =====GI=====  #Diet  -No active issues  -passed speech and swallow   -will start diet        =====RENAL/======   No active issues  -voiding without any issues     =====ENDOCRINE=====   No active issues  -A1c 6.3  -no need for sliding scale at this time      =====INFECTIOUS DISEASE=====  No active issues.      =====HEME/ONC/DVT PPX=====  #Microcytic Anemia   Hgb 12.6 w/ MCV 75.8, no evidence of bleeding  - Send iron studies  - Monitor CBC  - Maintain active T&S, transfuse for Hgb < 7    -DVT ppx: hep SQ      =====GOC=====  -full code  -wife involved in care     For Follow-Up:  [ ] Pending TTE w/ bubble study   [ ] PT/OT recs  [ ] MRI brain w/ and w/o patient endorsed claustrophobia neuro okay with outpatient MRI MICU Transfer Note  ---------------------------    Transfer from: MICU  Transfer to:  (  ) Medicine    ( x ) Telemetry   (  ) RCU    (  ) Palliative    (  ) Stroke Unit    (  ) _______________  Accepting Physician:      MICU COURSE    50 y/o M with no known past medical history who presents to the ED with L arm/leg weakness and numbness that initially began around 6 PM this evening, resolved around 8PM, and then returned suddenly around 9:30 PM and pt was unable to ambulate 2/2 to LLE weakness, so wife called EMS. Pt denies headache, neck pain/stiffness, visual changes, chest pain, SOB, difficulty speaking. On exam in the ED pt with L sided pronator drift, and difficulty with finger to nose on the left. Code stroke was called from triage, pt taken directly to CT scan and evaluation by Neuro outside CT. Presentation concerning for stroke. Family history significant for CVA.  CTH w/o acute pathology, CT perfusion w/o evidence of core infarction or penumbra, CTA head/neck no significant occlusion or stenosis or vascular abnormality. S/p TNK at 23:43, s/p IVP Labtalol 10 x2, and briefly on cardene gtt. Patient admitted to MICU for q1h neuro checks s/p TNK administration. MICU course uncomplicated and 24h scan w/o acute intracranial hemorrhage, mass effect, or shift of the midline structures. Patient reports resolution of symptoms after TNK and there are currently no focal findings on neuro exam. Patient able to eat and tolerates PO diet. Currently saturating 100% on RA. Patient currently w/ permissive hypertension continue to monitor. Patient currently hemodynamically stable and ready for transfer to floor.          ASSESSMENT & PLAN:     50 yo M with no known past medical history who presents to the ED with LUE/LLE weakness and numbness c/f CVA, s/p TNK, admitted to MICU for closer monitoring and q1h neurochecks.     =====NEURO=====  #Acute CVA vs. TIA s/p TNK    -presented with LLE weakness and difficulty ambulating. Stroke code called and CTH negative S/p TNK  4/21 at 23:43  -CTH: no acute intracranial pathology, possible mild microangiopathic ischemic changes   -CT perfusion: no evidence of core infarction or penumbra   -CTA head/neck: no significant occlusion or stenosis or vascular abnormality   - Neurochecks q4h   - Neuro following   - Started patient on ASA 81mg and hep SQ for DVT ppx as per neuro   - atorvastatin started  - Repeat CTH 24hrs after TNK administration negative  - MRI brain w/ and w/o patient endorsed claustrophobia neuro okay with outpatient MRI   - PT/OT in place     =====PULMONARY=====  On RA, satting well. No active issues.      =====CARDIOVASCULAR=====  #HTN    -Hypertensive in ED requiring labetalol IVPx2 currently no requiring cardene gtt  - Goal BP <180/105 for at least 24hrs after TNK    - TTE w/ bubble study: Left ventricular systolic function is normal with an ejection fraction of 64 %; neg bubble study    =====GI=====  #Diet  -No active issues  -passed speech and swallow   -will start diet        =====RENAL/======   No active issues  -voiding without any issues     =====ENDOCRINE=====   No active issues  -A1c 6.3  -no need for sliding scale at this time      =====INFECTIOUS DISEASE=====  No active issues.      =====HEME/ONC/DVT PPX=====  #Microcytic Anemia   Hgb 12.6 w/ MCV 75.8, no evidence of bleeding  - Send iron studies  - Monitor CBC  - Maintain active T&S, transfuse for Hgb < 7    -DVT ppx: hep SQ      =====GOC=====  -full code  -wife involved in care     For Follow-Up:  [ ] BP <180/105 x 48h with graudal normotension over 2-3d  [ ] neuro recs  [ ] MRI brain w/ and w/o patient endorsed claustrophobia neuro okay with outpatient MRI MICU Transfer Note  ---------------------------    Transfer from: MICU  Transfer to:  (  ) Medicine    ( x ) Telemetry   (  ) RCU    (  ) Palliative    (  ) Stroke Unit    (  ) _______________  Accepting Physician:      MICU COURSE    50 y/o M with no known past medical history who presents to the ED with L arm/leg weakness and numbness that initially began around 6 PM this evening, resolved around 8PM, and then returned suddenly around 9:30 PM and pt was unable to ambulate 2/2 to LLE weakness, so wife called EMS. Pt denies headache, neck pain/stiffness, visual changes, chest pain, SOB, difficulty speaking. On exam in the ED pt with L sided pronator drift, and difficulty with finger to nose on the left. Code stroke was called from triage, pt taken directly to CT scan and evaluation by Neuro outside CT. Presentation concerning for stroke. Family history significant for CVA.  CTH w/o acute pathology, CT perfusion w/o evidence of core infarction or penumbra, CTA head/neck no significant occlusion or stenosis or vascular abnormality. S/p TNK at 23:43, s/p IVP Labtalol 10 x2, and briefly on cardene gtt. Patient admitted to MICU for q1h neuro checks s/p TNK administration. MICU course uncomplicated and 24h scan w/o acute intracranial hemorrhage, mass effect, or shift of the midline structures. Patient reports resolution of symptoms after TNK and there are currently no focal findings on neuro exam. Patient able to eat and tolerates PO diet. Currently saturating 100% on RA. Patient currently w/ permissive hypertension continue to monitor. Patient currently hemodynamically stable and ready for transfer to floor.          ASSESSMENT & PLAN:     48 yo M with no known past medical history who presents to the ED with LUE/LLE weakness and numbness c/f CVA, s/p TNK, admitted to MICU for closer monitoring and q1h neurochecks.     =====NEURO=====  #Acute CVA vs. TIA s/p TNK    -presented with LLE weakness and difficulty ambulating. Stroke code called and CTH negative S/p TNK  4/21 at 23:43  -CTH: no acute intracranial pathology, possible mild microangiopathic ischemic changes   -CT perfusion: no evidence of core infarction or penumbra   -CTA head/neck: no significant occlusion or stenosis or vascular abnormality   - Neurochecks q4h   - Neuro following   - Started patient on ASA 81mg and hep SQ for DVT ppx as per neuro   - atorvastatin started  - Repeat CTH 24hrs after TNK administration negative  - MRI brain w/ and w/o patient endorsed claustrophobia neuro okay with outpatient MRI   - PT/OT in place     =====PULMONARY=====  On RA, satting well. No active issues.      =====CARDIOVASCULAR=====  #HTN    -Hypertensive in ED requiring labetalol IVPx2 currently no requiring cardene gtt  - Goal BP <180/105 for at least 24hrs after TNK    - TTE w/ bubble study: Left ventricular systolic function is normal with an ejection fraction of 64 %; neg bubble study    =====GI=====  #Diet  -No active issues  -passed speech and swallow   -will start diet        =====RENAL/======   No active issues  -voiding without any issues     =====ENDOCRINE=====   No active issues  -A1c 6.3  -no need for sliding scale at this time      =====INFECTIOUS DISEASE=====  No active issues.      =====HEME/ONC/DVT PPX=====  #Microcytic Anemia   Hgb 12.6 w/ MCV 75.8, no evidence of bleeding  - Send iron studies  - Monitor CBC  - Maintain active T&S, transfuse for Hgb < 7    -DVT ppx: hep SQ      =====GOC=====  -full code  -wife involved in care     For Follow-Up:  [ ] BP <180/105 x 48h with gradual normotension over 2-3d  [ ] neuro recs  [ ] MRI brain w/ and w/o patient endorsed claustrophobia neuro okay with outpatient MRI MICU Transfer Note  ---------------------------    Transfer from: MICU  Transfer to:  (  ) Medicine    ( x ) Telemetry   (  ) RCU    (  ) Palliative    (  ) Stroke Unit    (  ) _______________  Accepting Physician: Dr. Cesar      [facility]    48 y/[facility] M with no known past medical history who presents to the ED with L arm/leg weakness and numbness that initially began around 6 PM this evening, resolved around 8PM, and then returned suddenly around 9:30 PM and pt was unable to ambulate 2/2 to LLE weakness, so wife called EMS. Pt denies headache, neck pain/stiffness, visual changes, chest pain, SOB, difficulty speaking. On exam in the ED pt with L sided pronator drift, and difficulty with finger to nose on the left. Code stroke was called from triage, pt taken directly to CT scan and evaluation by Neuro outside CT. Presentation concerning for stroke. Family history significant for CVA.  CTH w/o acute pathology, CT perfusion w/o evidence of core infarction or penumbra, CTA head/neck no significant occlusion or stenosis or vascular abnormality. S/p TNK at 23:43, s/p IVP Labtalol 10 x2, and briefly on cardene gtt. Patient admitted to MICU for q1h neuro checks s/p TNK administration. MICU course uncomplicated and 24h scan w/o acute intracranial hemorrhage, mass effect, or shift of the midline structures. Patient reports resolution of symptoms after TNK and there are currently no focal findings on neuro exam. Patient able to eat and tolerates PO diet. Currently saturating 100% on RA. Patient currently w/ permissive hypertension continue to monitor. Patient currently hemodynamically stable and ready for transfer to floor.          ASSESSMENT & PLAN:     50 yo M with no known past medical history who presents to the ED with LUE/LLE weakness and numbness c/f CVA, s/p TNK, admitted to MICU for closer monitoring and q1h neurochecks.     =====NEURO=====  #Acute CVA vs. TIA s/p TNK    -presented with LLE weakness and difficulty ambulating. Stroke code called and CTH negative S/p TNK  4/21 at 23:43  -CTH: no acute intracranial pathology, possible mild microangiopathic ischemic changes   -CT perfusion: no evidence of core infarction or penumbra   -CTA head/neck: no significant occlusion or stenosis or vascular abnormality   - Neurochecks q4h   - Neuro following   - Started patient on ASA 81mg and hep SQ for DVT ppx as per neuro   - atorvastatin started  - Repeat CTH 24hrs after TNK administration negative  - MRI brain w/ and w/o patient endorsed claustrophobia neuro okay with outpatient MRI   - PT/OT in place     =====PULMONARY=====  On RA, satting well. No active issues.      =====CARDIOVASCULAR=====  #HTN    -Hypertensive in ED requiring labetalol IVPx2 currently no requiring cardene gtt  - Goal BP <180/105 for at least 24hrs after TNK    - TTE w/ bubble study: Left ventricular systolic function is normal with an ejection fraction of 64 %; neg bubble study    =====GI=====  #Diet  -No active issues  -passed speech and swallow   -will start diet        =====RENAL/======   No active issues  -voiding without any issues     =====ENDOCRINE=====   No active issues  -A1c 6.3  -no need for sliding scale at this time      =====INFECTIOUS DISEASE=====  No active issues.      =====HEME/ONC/DVT PPX=====  #Microcytic Anemia   Hgb 12.6 w/ MCV 75.8, no evidence of bleeding  - Send iron studies  - Monitor CBC  - Maintain active T&S, transfuse for Hgb < 7    -DVT ppx: hep SQ      =====GOC=====  -full code  -wife involved in care     For Follow-Up:  [ ] BP <180/105 x 48h with gradual normotension over 2-3d  [ ] neuro recs  [ ] MRI brain w/ and w/o patient endorsed claustrophobia neuro okay with outpatient MRI MICU Transfer Note  ---------------------------    Transfer from: MICU  Transfer to:  (  ) Medicine    ( x ) Telemetry   (  ) RCU    (  ) Palliative    (  ) Stroke Unit    (  ) _______________  Accepting Physician: Dr. Cesar      [facility]    48 y/o M with no known past medical history who presents to the ED with L arm/leg weakness and numbness that initially began around 6 PM this evening, resolved around 8PM, and then returned suddenly around 9:30 PM and pt was unable to ambulate 2/2 to LLE weakness, so wife called EMS. Pt denies headache, neck pain/stiffness, visual changes, chest pain, SOB, difficulty speaking. On exam in the ED pt with L sided pronator drift, and difficulty with finger to nose on the left. Code stroke was called from triage, pt taken directly to CT scan and evaluation by Neuro outside CT. Presentation concerning for stroke. Family history significant for CVA.  CTH w/o acute pathology, CT perfusion w/o evidence of core infarction or penumbra, CTA head/neck no significant occlusion or stenosis or vascular abnormality. S/p TNK at 23:43, s/p IVP Labtalol 10 x2, and briefly on cardene gtt. Patient admitted to MICU for q1h neuro checks s/p TNK administration. MICU course uncomplicated and 24h scan w/o acute intracranial hemorrhage, mass effect, or shift of the midline structures. Patient reports resolution of symptoms after TNK and there are currently no focal findings on neuro exam. Patient able to eat and tolerates PO diet. Currently saturating 100% on RA. Patient currently w/ permissive hypertension continue to monitor. Patient currently hemodynamically stable and ready for transfer to floor.          ASSESSMENT & PLAN:     48 yo M with no known past medical history who presents to the ED with LUE/LLE weakness and numbness c/f CVA, s/p TNK, admitted to MICU for closer monitoring and q1h neurochecks.     =====NEURO=====  #Acute CVA vs. TIA s/p TNK    -presented with LLE weakness and difficulty ambulating. Stroke code called and CTH negative S/p TNK  4/21 at 23:43  -CTH: no acute intracranial pathology, possible mild microangiopathic ischemic changes   -CT perfusion: no evidence of core infarction or penumbra   -CTA head/neck: no significant occlusion or stenosis or vascular abnormality   - Neurochecks q4h   - Neuro following   - Started patient on ASA 81mg and hep SQ for DVT ppx as per neuro   - atorvastatin started  - Repeat CTH 24hrs after TNK administration negative  - MRI brain w/ and w/o patient endorsed claustrophobia neuro okay with outpatient MRI   - PT/OT in place     =====PULMONARY=====  On RA, satting well. No active issues.      =====CARDIOVASCULAR=====  #HTN    -Hypertensive in ED requiring labetalol IVPx2 currently no requiring cardene gtt  - Goal BP <180/105 for at least 24hrs after TNK goal now in <140/90   -continues to be hypertensive will start amlodipine 5mg daily   - TTE w/ bubble study: Left ventricular systolic function is normal with an ejection fraction of 64 %; neg bubble study    =====GI=====  #Diet  -No active issues  -passed speech and swallow   -tolerating diet      =====RENAL/======   No active issues  -voiding without any issues     =====ENDOCRINE=====   No active issues  -A1c 6.3  -no need for sliding scale at this time      =====INFECTIOUS DISEASE=====  No active issues.      =====HEME/ONC/DVT PPX=====  #Microcytic Anemia   Hgb 12.6 w/ MCV 75.8, no evidence of bleeding  - Send iron studies  - Monitor CBC  - Maintain active T&S, transfuse for Hgb < 7    -DVT ppx: hep SQ      =====GOC=====  -full code  -wife involved in care     For Follow-Up:  [ ] BP goal <140/90 monitor BP closely after starting amlodopine  [ ] follow up outpatient with neurology   [ ] f/u with PCP outpatient to monitor hypertension   [ ] neuro recs  [ ] MRI brain w/ and w/o patient endorsed claustrophobia neuro okay with outpatient MRI

## 2025-04-22 NOTE — SWALLOW BEDSIDE ASSESSMENT ADULT - COMMENTS
H&P Adult 4/22: "48 yo M with no known past medical history who presents to the ED with L arm/leg weakness and numbness that initially began around 6 PM this evening, resolved around 8PM, and then returned suddently around 9:30 PM and pt was unable to ambulate 2/2 to LLE weakness, so wife called EMS. Pt denies headache, neck pain/stiffness, visual changes, chest pain, SOB, difficulty speaking. On exam in the ED pt with L sided pronator drift, and difficulty with finger to nose on the left. Code stroke was called from triage, pt taken directly to CT scan and evaluation by Neuro outside CT. Presentation concerning for stroke. Family history significant for CVA. CTH w/o acute pathology, CT perfusion w/o evidence of core infarction or penumbra, CTA head/neck no significant occlusion or stenosis or vascular abnormality. S/p TNK at 23:43, s/p IVP Labtalol 10 x2, now on Cardene gtt."    Neurology Note 4/22: "Impression: L. 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 Mechanism pending further workup but likely small vessel disease i/v/o uncontrolled HTN."    Patient seen awake/alert and oriented state this AM in MICU 6 with patient's wife and family member present at bedside. Patient denies any difficulty swallowing and states he eats regular solids at home. Clearance for PO intake received prior to evaluation given patient is s/p TNK. Unable to speak

## 2025-04-22 NOTE — ED ADULT NURSE NOTE - OBJECTIVE STATEMENT
FRANSISCA RN; as per pt and wife pt started having left arm weakness and left leg weakness with difficulty walking around 630-7 pm after going to the gym at approximately 530 pm; Pt reported that by 730 pm his symptoms had resolved spontaneously after drinking some juice and some snacks; by 930 pm pt started having the same symptoms followed by increased inability to walk or stand. Upon arrival to ED, BGL WNL, pt noted with left sided weakness and drift with impaired balance and gait; Code stroke activated by ED providers FRANSISCA RN; as per pt and wife pt started having left arm weakness and left leg weakness with difficulty walking around 630-7 pm after going to the gym at approximately 530 pm; Pt reported that by 730 pm his symptoms had resolved spontaneously after drinking some juice and some snacks; by 930 pm pt started having the same symptoms followed by increased inability to walk or stand. LKW determined to be approximately 2130 . Upon arrival to ED, BGL WNL, pt noted with left sided weakness and drift with impaired balance and gait, (please see ED stroke flowsheet for NIH assessment); Code stroke activated by ED providers, Pt taken to CT STAT, ED providers at bedside neuro resident at bedside, RN at bedside. Initial NIH score of 4 (please see ED Stroke flowsheet). 18 G PIV placed in bilateral AC's, pt noted to be hypertensive upon arrival. Pt given BP medications as ordered, Placed on continuos cardiac monitor. Pt with PERRLA pupils bilaterally, Pt with no known PMHx, does not take blood thinners, denies any recent head trauma, no external injuries visible. Skin is intact, warm and dry, speaking in clear complete sentences. Safety is maintained,

## 2025-04-22 NOTE — PROGRESS NOTE ADULT - SUBJECTIVE AND OBJECTIVE BOX
Interval Events:    HPI:  50 yo M with no known past medical history who presents to the ED with L arm/leg weakness and numbness that initially began around 6 PM this evening, resolved around 8PM, and then returned suddently around 9:30 PM and pt was unable to ambulate 2/2 to LLE weakness, so wife called EMS. Pt denies headache, neck pain/stiffness, visual changes, chest pain, SOB, difficulty speaking. On exam in the ED pt with L sided pronator drift, and difficulty with finger to nose on the left. Code stroke was called from triage, pt taken directly to CT scan and evaluation by Neuro outside CT. Presentation concerning for stroke. Family history significant for CVA.      CTH w/o acute pathology, CT perfusion w/o evidence of core infarction or penumbra, CTA head/neck no significant occlusion or stenosis or vascular abnormality. S/p TNK at 23:43, s/p IVP Labtalol 10 x2, now on Cardene gtt.      VS: Hypertensive up to 191/98, otherwise afebrile, HR 80s, normal RR, satting 100% on RA.    EKG:  NSR, possible LVF  (22 Apr 2025 00:21)      REVIEW OF SYSTEMS:  Constitutional: [ ] negative [ ] fevers [ ] chills [ ] weight loss [ ] weight gain  HEENT: [ ] negative [ ] dry eyes [ ] eye irritation [ ] postnasal drip [ ] nasal congestion  CV: [ ] negative  [ ] chest pain [ ] orthopnea [ ] palpitations [ ] murmur  Resp: [ ] negative [ ] cough [ ] shortness of breath [ ] dyspnea [ ] wheezing [ ] sputum [ ] hemoptysis  GI: [ ] negative [ ] nausea [ ] vomiting [ ] diarrhea [ ] constipation [ ] abd pain [ ] dysphagia   : [ ] negative [ ] dysuria [ ] nocturia [ ] hematuria [ ] increased urinary frequency  Musculoskeletal: [ ] negative [ ] back pain [ ] myalgias [ ] arthralgias [ ] fracture  Skin: [ ] negative [ ] rash [ ] itch  Neurological: [ ] negative [ ] headache [ ] dizziness [ ] syncope [ ] weakness [ ] numbness  Psychiatric: [ ] negative [ ] anxiety [ ] depression  Endocrine: [ ] negative [ ] diabetes [ ] thyroid problem  Hematologic/Lymphatic: [ ] negative [ ] anemia [ ] bleeding problem  Allergic/Immunologic: [ ] negative [ ] itchy eyes [ ] nasal discharge [ ] hives [ ] angioedema  [ ] All other systems negative  [ ] Unable to assess ROS because ________    OBJECTIVE:  ICU Vital Signs Last 24 Hrs  T(C): 36.6 (22 Apr 2025 04:00), Max: 36.7 (22 Apr 2025 00:00)  T(F): 97.9 (22 Apr 2025 04:00), Max: 98.1 (22 Apr 2025 00:00)  HR: 81 (22 Apr 2025 06:30) (76 - 96)  BP: 163/98 (22 Apr 2025 06:30) (139/125 - 208/117)  BP(mean): 116 (22 Apr 2025 06:30) (91 - 132)  ABP: --  ABP(mean): --  RR: 19 (22 Apr 2025 06:30) (15 - 22)  SpO2: 98% (22 Apr 2025 06:30) (96% - 100%)    O2 Parameters below as of 22 Apr 2025 06:30  Patient On (Oxygen Delivery Method): room air      04-21 @ 07:01  -  04-22 @ 07:00  --------------------------------------------------------  IN: 100 mL / OUT: 880 mL / NET: -780 mL      CAPILLARY BLOOD GLUCOSE      POCT Blood Glucose.: 170 mg/dL (21 Apr 2025 22:47)      Physical Exam:   Constitutional:  no acute distress   HEENT: + PERRLA, EOMI, no drainage or redness  Neck: supple,  No JVD  Respiratory: breath Sounds equal & clear bilaterally to auscultation, no accessory muscle use noted  Cardiovascular: Regular rate, regular rhythm, normal S1, S2; no murmurs or rub  Gastrointestinal: Soft, non-tender, non distended, no hepatosplenomegaly, normal bowel sounds  Extremities: + 2 peripheral edema, no cyanosis, no clubbing   Vascular: Equal and normal pulses: 2+ peripheral pulses throughout  Neurological:   Psychiatric: calm, normal mood, normal affect  Musculoskeletal: No joint swelling or deformity; no limitation of movement  Skin: warm, dry, well perfused, no rashes    HOSPITAL MEDICATIONS:  Standing Meds:  atorvastatin 40 milliGRAM(s) Oral at bedtime  influenza   Vaccine 0.5 milliLiter(s) IntraMuscular once  niCARdipine Infusion 5 mG/Hr IV Continuous <Continuous>      PRN Meds:      LABS:                        12.6   6.96  )-----------( 180      ( 21 Apr 2025 23:00 )             37.5     Hgb Trend: 12.6<--  04-21    137  |  99  |  18  ----------------------------<  146[H]  3.9   |  24  |  1.15    Ca    9.4      21 Apr 2025 23:00    TPro  6.9  /  Alb  4.2  /  TBili  0.4  /  DBili  x   /  AST  24  /  ALT  24  /  AlkPhos  77  04-21    Creatinine Trend: 1.15<--  PT/INR - ( 21 Apr 2025 23:00 )   PT: 10.9 sec;   INR: 0.94 ratio         PTT - ( 21 Apr 2025 23:00 )  PTT:31.2 sec  Urinalysis Basic - ( 21 Apr 2025 23:00 )    Color: x / Appearance: x / SG: x / pH: x  Gluc: 146 mg/dL / Ketone: x  / Bili: x / Urobili: x   Blood: x / Protein: x / Nitrite: x   Leuk Esterase: x / RBC: x / WBC x   Sq Epi: x / Non Sq Epi: x / Bacteria: x        Venous Blood Gas:  04-21 @ 23:00  7.40/49/54/30/83.7  VBG Lactate: 2.0           Interval Events:  -no acute events reported overnight   -neurological symptoms have resolved     HPI:  48 yo M with no known past medical history who presents to the ED with L arm/leg weakness and numbness that initially began around 6 PM this evening, resolved around 8PM, and then returned suddently around 9:30 PM and pt was unable to ambulate 2/2 to LLE weakness, so wife called EMS. Pt denies headache, neck pain/stiffness, visual changes, chest pain, SOB, difficulty speaking. On exam in the ED pt with L sided pronator drift, and difficulty with finger to nose on the left. Code stroke was called from triage, pt taken directly to CT scan and evaluation by Neuro outside CT. Presentation concerning for stroke. Family history significant for CVA.      CTH w/o acute pathology, CT perfusion w/o evidence of core infarction or penumbra, CTA head/neck no significant occlusion or stenosis or vascular abnormality. S/p TNK at 23:43, s/p IVP Labtalol 10 x2, now on Cardene gtt.      VS: Hypertensive up to 191/98, otherwise afebrile, HR 80s, normal RR, satting 100% on RA.    EKG:  NSR, possible LVF  (22 Apr 2025 00:21)      REVIEW OF SYSTEMS:    [x ] All systems negative  [ ] Unable to assess ROS because ________    OBJECTIVE:  ICU Vital Signs Last 24 Hrs  T(C): 36.6 (22 Apr 2025 04:00), Max: 36.7 (22 Apr 2025 00:00)  T(F): 97.9 (22 Apr 2025 04:00), Max: 98.1 (22 Apr 2025 00:00)  HR: 81 (22 Apr 2025 06:30) (76 - 96)  BP: 163/98 (22 Apr 2025 06:30) (139/125 - 208/117)  BP(mean): 116 (22 Apr 2025 06:30) (91 - 132)  ABP: --  ABP(mean): --  RR: 19 (22 Apr 2025 06:30) (15 - 22)  SpO2: 98% (22 Apr 2025 06:30) (96% - 100%)    O2 Parameters below as of 22 Apr 2025 06:30  Patient On (Oxygen Delivery Method): room air      04-21 @ 07:01  -  04-22 @ 07:00  --------------------------------------------------------  IN: 100 mL / OUT: 880 mL / NET: -780 mL      CAPILLARY BLOOD GLUCOSE      POCT Blood Glucose.: 170 mg/dL (21 Apr 2025 22:47)      Physical Exam:   Constitutional:  no acute distress   HEENT: + PERRLA, EOMI, no drainage or redness  Neck: supple,  No JVD  Respiratory: breath Sounds equal & clear bilaterally to auscultation, no accessory muscle use noted  Cardiovascular: Regular rate, regular rhythm, normal S1, S2; no murmurs or rub  Gastrointestinal: Soft, non-tender, non distended, no hepatosplenomegaly, normal bowel sounds  Extremities: no peripheral edema, no cyanosis, no clubbing   Vascular: Equal and normal pulses: 2+ peripheral pulses throughout  Neurological: alert and oriented   Psychiatric: calm   Musculoskeletal: No joint swelling or deformity; no limitation of movement  Skin: warm, dry, well perfused, no rashes    HOSPITAL MEDICATIONS:  Standing Meds:  atorvastatin 40 milliGRAM(s) Oral at bedtime  influenza   Vaccine 0.5 milliLiter(s) IntraMuscular once  niCARdipine Infusion 5 mG/Hr IV Continuous <Continuous>      PRN Meds:      LABS:                        12.6   6.96  )-----------( 180      ( 21 Apr 2025 23:00 )             37.5     Hgb Trend: 12.6<--  04-21    137  |  99  |  18  ----------------------------<  146[H]  3.9   |  24  |  1.15    Ca    9.4      21 Apr 2025 23:00    TPro  6.9  /  Alb  4.2  /  TBili  0.4  /  DBili  x   /  AST  24  /  ALT  24  /  AlkPhos  77  04-21    Creatinine Trend: 1.15<--  PT/INR - ( 21 Apr 2025 23:00 )   PT: 10.9 sec;   INR: 0.94 ratio         PTT - ( 21 Apr 2025 23:00 )  PTT:31.2 sec  Urinalysis Basic - ( 21 Apr 2025 23:00 )    Color: x / Appearance: x / SG: x / pH: x  Gluc: 146 mg/dL / Ketone: x  / Bili: x / Urobili: x   Blood: x / Protein: x / Nitrite: x   Leuk Esterase: x / RBC: x / WBC x   Sq Epi: x / Non Sq Epi: x / Bacteria: x        Venous Blood Gas:  04-21 @ 23:00  7.40/49/54/30/83.7  VBG Lactate: 2.0

## 2025-04-23 LAB
ALBUMIN SERPL ELPH-MCNC: 4.1 G/DL — SIGNIFICANT CHANGE UP (ref 3.3–5)
ALP SERPL-CCNC: 74 U/L — SIGNIFICANT CHANGE UP (ref 40–120)
ALT FLD-CCNC: 24 U/L — SIGNIFICANT CHANGE UP (ref 4–41)
ANION GAP SERPL CALC-SCNC: 12 MMOL/L — SIGNIFICANT CHANGE UP (ref 7–14)
AST SERPL-CCNC: 20 U/L — SIGNIFICANT CHANGE UP (ref 4–40)
BASOPHILS # BLD AUTO: 0.06 K/UL — SIGNIFICANT CHANGE UP (ref 0–0.2)
BASOPHILS NFR BLD AUTO: 1 % — SIGNIFICANT CHANGE UP (ref 0–2)
BILIRUB SERPL-MCNC: 0.5 MG/DL — SIGNIFICANT CHANGE UP (ref 0.2–1.2)
BUN SERPL-MCNC: 14 MG/DL — SIGNIFICANT CHANGE UP (ref 7–23)
CALCIUM SERPL-MCNC: 9.4 MG/DL — SIGNIFICANT CHANGE UP (ref 8.4–10.5)
CHLORIDE SERPL-SCNC: 101 MMOL/L — SIGNIFICANT CHANGE UP (ref 98–107)
CO2 SERPL-SCNC: 24 MMOL/L — SIGNIFICANT CHANGE UP (ref 22–31)
CREAT SERPL-MCNC: 0.88 MG/DL — SIGNIFICANT CHANGE UP (ref 0.5–1.3)
EGFR: 104 ML/MIN/1.73M2 — SIGNIFICANT CHANGE UP
EGFR: 104 ML/MIN/1.73M2 — SIGNIFICANT CHANGE UP
EOSINOPHIL # BLD AUTO: 0.45 K/UL — SIGNIFICANT CHANGE UP (ref 0–0.5)
EOSINOPHIL NFR BLD AUTO: 7.2 % — HIGH (ref 0–6)
GLUCOSE SERPL-MCNC: 191 MG/DL — HIGH (ref 70–99)
HCT VFR BLD CALC: 38 % — LOW (ref 39–50)
HGB BLD-MCNC: 12.5 G/DL — LOW (ref 13–17)
IANC: 3.03 K/UL — SIGNIFICANT CHANGE UP (ref 1.8–7.4)
IMM GRANULOCYTES NFR BLD AUTO: 0.3 % — SIGNIFICANT CHANGE UP (ref 0–0.9)
LYMPHOCYTES # BLD AUTO: 2.17 K/UL — SIGNIFICANT CHANGE UP (ref 1–3.3)
LYMPHOCYTES # BLD AUTO: 34.9 % — SIGNIFICANT CHANGE UP (ref 13–44)
MAGNESIUM SERPL-MCNC: 1.9 MG/DL — SIGNIFICANT CHANGE UP (ref 1.6–2.6)
MCHC RBC-ENTMCNC: 25.7 PG — LOW (ref 27–34)
MCHC RBC-ENTMCNC: 32.9 G/DL — SIGNIFICANT CHANGE UP (ref 32–36)
MCV RBC AUTO: 78 FL — LOW (ref 80–100)
MONOCYTES # BLD AUTO: 0.49 K/UL — SIGNIFICANT CHANGE UP (ref 0–0.9)
MONOCYTES NFR BLD AUTO: 7.9 % — SIGNIFICANT CHANGE UP (ref 2–14)
MRSA PCR RESULT.: SIGNIFICANT CHANGE UP
NEUTROPHILS # BLD AUTO: 3.03 K/UL — SIGNIFICANT CHANGE UP (ref 1.8–7.4)
NEUTROPHILS NFR BLD AUTO: 48.7 % — SIGNIFICANT CHANGE UP (ref 43–77)
NRBC # BLD AUTO: 0 K/UL — SIGNIFICANT CHANGE UP (ref 0–0)
NRBC # FLD: 0 K/UL — SIGNIFICANT CHANGE UP (ref 0–0)
NRBC BLD AUTO-RTO: 0 /100 WBCS — SIGNIFICANT CHANGE UP (ref 0–0)
PHOSPHATE SERPL-MCNC: 4.2 MG/DL — SIGNIFICANT CHANGE UP (ref 2.5–4.5)
PLATELET # BLD AUTO: 173 K/UL — SIGNIFICANT CHANGE UP (ref 150–400)
POTASSIUM SERPL-MCNC: 3.6 MMOL/L — SIGNIFICANT CHANGE UP (ref 3.5–5.3)
POTASSIUM SERPL-SCNC: 3.6 MMOL/L — SIGNIFICANT CHANGE UP (ref 3.5–5.3)
PROT SERPL-MCNC: 6.8 G/DL — SIGNIFICANT CHANGE UP (ref 6–8.3)
RBC # BLD: 4.87 M/UL — SIGNIFICANT CHANGE UP (ref 4.2–5.8)
RBC # FLD: 14 % — SIGNIFICANT CHANGE UP (ref 10.3–14.5)
S AUREUS DNA NOSE QL NAA+PROBE: SIGNIFICANT CHANGE UP
SODIUM SERPL-SCNC: 137 MMOL/L — SIGNIFICANT CHANGE UP (ref 135–145)
WBC # BLD: 6.22 K/UL — SIGNIFICANT CHANGE UP (ref 3.8–10.5)
WBC # FLD AUTO: 6.22 K/UL — SIGNIFICANT CHANGE UP (ref 3.8–10.5)

## 2025-04-23 PROCEDURE — 99233 SBSQ HOSP IP/OBS HIGH 50: CPT

## 2025-04-23 RX ORDER — ASPIRIN 325 MG
1 TABLET ORAL
Qty: 30 | Refills: 0
Start: 2025-04-23

## 2025-04-23 RX ORDER — AMLODIPINE BESYLATE 10 MG/1
5 TABLET ORAL DAILY
Refills: 0 | Status: DISCONTINUED | OUTPATIENT
Start: 2025-04-23 | End: 2025-04-24

## 2025-04-23 RX ORDER — FLUTICASONE PROPIONATE 50 UG/1
1 SPRAY, METERED NASAL
Refills: 0 | Status: DISCONTINUED | OUTPATIENT
Start: 2025-04-23 | End: 2025-04-24

## 2025-04-23 RX ORDER — ATORVASTATIN CALCIUM 80 MG/1
1 TABLET, FILM COATED ORAL
Qty: 30 | Refills: 0
Start: 2025-04-23

## 2025-04-23 RX ORDER — AMLODIPINE BESYLATE 10 MG/1
1 TABLET ORAL
Qty: 30 | Refills: 0
Start: 2025-04-23

## 2025-04-23 RX ORDER — DEXMEDETOMIDINE HYDROCHLORIDE IN SODIUM CHLORIDE 4 UG/ML
0.5 INJECTION INTRAVENOUS
Qty: 400 | Refills: 0 | Status: DISCONTINUED | OUTPATIENT
Start: 2025-04-23 | End: 2025-04-23

## 2025-04-23 RX ADMIN — AMLODIPINE BESYLATE 5 MILLIGRAM(S): 10 TABLET ORAL at 14:48

## 2025-04-23 RX ADMIN — Medication 1 APPLICATION(S): at 11:13

## 2025-04-23 RX ADMIN — HEPARIN SODIUM 5000 UNIT(S): 1000 INJECTION INTRAVENOUS; SUBCUTANEOUS at 06:24

## 2025-04-23 RX ADMIN — ATORVASTATIN CALCIUM 80 MILLIGRAM(S): 80 TABLET, FILM COATED ORAL at 22:38

## 2025-04-23 RX ADMIN — HEPARIN SODIUM 5000 UNIT(S): 1000 INJECTION INTRAVENOUS; SUBCUTANEOUS at 11:07

## 2025-04-23 RX ADMIN — HEPARIN SODIUM 5000 UNIT(S): 1000 INJECTION INTRAVENOUS; SUBCUTANEOUS at 22:42

## 2025-04-23 RX ADMIN — Medication 81 MILLIGRAM(S): at 11:07

## 2025-04-23 NOTE — DISCHARGE NOTE PROVIDER - CARE PROVIDER_API CALL
Maurizio Lawson  Sleep Medicine  [address] [place] [id number]  Phone: [phone]  Fax: [phone]  Follow Up Time:

## 2025-04-23 NOTE — PROGRESS NOTE ADULT - SUBJECTIVE AND OBJECTIVE BOX
~~~~~~~~~~~~~~~~~~~~~~~~~~~~~~~~~~~~~~~~~~~~~~~~~~  Neurology Service   [facility] , [id number] (7a-7p, otherwise call [phone])  ~~~~~~~~~~~~~~~~~~~~~~~~~~~~~~~~~~~~~~~~~~~~~~~~~~  History:  Interval History:  No adverse events over night  doing well  symptoms resolved  walking fine  would prefer MRI outpatient / not to delay discharge     Medications  Home Medications:    MEDICATIONS  (STANDING):  aspirin enteric coated 81 milliGRAM(s) Oral daily  atorvastatin 80 milliGRAM(s) Oral at bedtime  chlorhexidine 2% Cloths 1 Application(s) Topical daily  heparin   Injectable 5000 Unit(s) SubCutaneous every 8 hours  influenza   Vaccine 0.5 milliLiter(s) IntraMuscular once    MEDICATIONS  (PRN):    Exam:  Vitals:  Vital Signs Last 24 Hrs  T(C): 36.6 (04-23-25 @ 12:00), Max: 36.7 (04-22-25 @ 16:00)  T(F): 97.8 (04-23-25 @ 12:00), Max: 98.1 (04-22-25 @ 16:00)  HR: 80 (04-23-25 @ 12:00) (63 - 84)  BP: 155/103 (04-23-25 @ 12:00) (140/96 - 178/105)  BP(mean): 119 (04-23-25 @ 12:00) (92 - 128)  RR: 18 (04-23-25 @ 12:00) (14 - 28)  SpO2: 98% (04-23-25 @ 12:00) (97% - 100%)    I&O's Summary  22 Apr 2025 07:01  -  23 Apr 2025 07:00  --------------------------------------------------------  IN: 350 mL / OUT: 0 mL / NET: 350 mL  23 Apr 2025 07:01  -  23 Apr 2025 12:43  --------------------------------------------------------  IN: 120 mL / OUT: 0 mL / NET: 120 mL    Physical and Neurological Examination:   - General: Sat in chair, well appearing    - Mental Status:   awake, alert, oriented, conversational  speech clear and fluent  asks appropriate questions    - Cranial Nerves:   EOM grossly intact  no facial / symmetric smile    - Motor Testing:  No pronator drift     - Coordination: Finger-nose-finger intact without dysmetria.      Objective Studies  Labs:             12.5   6.22  )-----------( 173      ( 23 Apr 2025 00:25 )             38.0   137  |  101  |  14  ----------------------------<  191[H]  3.6   |  24  |  0.88  Ca    9.4      23 Apr 2025 00:25  Phos  4.2     04-23  Mg     1.90     04-23  TPro  6.8  /  Alb  4.1  /  TBili  0.5  /  DBili  x   /  AST  20  /  ALT  24  /  AlkPhos  74  04-23    Stroke labs  a1c 6.3%  tchol 235      HDL 39    CTH 4/21/25 2300  FINDINGS:  VENTRICLES AND SULCI: Normal in size and configuration.  INTRA-AXIAL: No mass effect, acute hemorrhage, or midline shift.    Suggestion of mild microangiopathic ischemic change.  EXTRA-AXIAL: No mass or fluid collection. Basal cisterns are normal in appearance.  VISUALIZED SINUSES:  Scattered mucosal thickening, Mild-to-moderate left ethmoid air cells posteriorly.  TYMPANOMASTOID CAVITIES:  Clear.  VISUALIZED ORBITS: Normal.  CALVARIUM: Intact.  MISCELLANEOUS: None.  IMPRESSION:  No evidence of acute intracranial pathology.  MRI could be considered if clinically warranted.  Left ethmoidal sinus disease.    CTH/CTA 4/21/25 2300  IMPRESSION:    CT PERFUSION:  No evidence of core infarction or penumbra.    CTA NECK:  No evidence of significant stenosis or occlusion.    CTA HEAD:  No large vessel occlusion, significant stenosis or vascular abnormality   identified.    CTH 4/22/25 22:54  FINDINGS: There is no acute intracranial hemorrhage, masseffect, shift   of the midline structures, herniation, extra-axial fluid collection, or   hydrocephalus.    Minimal scattered mucosal thickening is seen throughout the paranasal   sinuses. The bilateral tympanomastoid cavities are clear. The orbits are   unremarkable. The calvarium is intact.    IMPRESSION: No acute intracranial hemorrhage, mass effect, or shift of   the midline structures.    TTE 4/22/25   1. Left ventricular cavity is normal in size. Left ventricular systolic function is normal with an ejection fraction of 64 % by 3D. There are no regional wall motion abnormalities seen.   2. There is mild (grade 1) left ventricular diastolic dysfunction.   3. Normal right ventricular systolic function.   4. Left atrium is mildly dilated.   5. No pericardial effusion seen.   6. Agitated saline injection was negative for intracardiac shunt.   7. Estimated pulmonary artery systolic pressure is 20 mmHg.   8. The inferior vena cava is normal in size measuring 1.31 cm in diameter, (normal <2.1cm) with normal inspiratory collapse (normal >50%) consistent with normal right atrial pressure (~3, range 0-5mmHg).

## 2025-04-23 NOTE — DISCHARGE NOTE PROVIDER - NSDCCPCAREPLAN_GEN_ALL_CORE_FT
PRINCIPAL DISCHARGE DIAGNOSIS  Diagnosis: Stroke  Assessment and Plan of Treatment: During your hospitalization you were exhibiting signs and symptoms of a stroke, which occurs when blood flow to part of the brain is blocked. This can lead to sudden weakness, numbness, or difficulty speaking. Your CT scan was negative for a stroke, but at times a stroke will not immediately show up on this imaging, especially an ischemic stroke. Ct scans are great at detecting blood (hemorrhagic strokes), but less sensitive to early tissue damage from lack of blood flow (ischemic stroke). To treat the symptoms you were experiencing you were given a medication called Tenectaplase which is also referred to as a "clot busting" medication. This medication  is used to dissolve blood clots that can cause ischemic strokes, and is most effective when given within a few hours of stroke like symptoms. After receiving tenectaplase your symptoms resolved but needed to be closely monitored in the ICU. Your 24 hour repeat CT scan was also negative and is now recommended that you follow up with neurology and schedule an outpatient MRI      SECONDARY DISCHARGE DIAGNOSES  Diagnosis: Hypertension  Assessment and Plan of Treatment: While you were here your blood pressure was reported to be very high. Your were given IV medications to lower your blood pressure. We allow higher blood pressures after a stroke diagnosis in order to perfuse the brain but thats only within the first 24-48 hours. The goal after that time is <140/90, but your blood pressure remained above that. We started you on a medication called amlodopine 5mg with the plan to monitor you and for you to follow up with your primary care doctor for further evaluation of hypertension     PRINCIPAL DISCHARGE DIAGNOSIS  Diagnosis: Stroke  Assessment and Plan of Treatment: During your hospitalization you were exhibiting signs and symptoms of a stroke, which occurs when blood flow to part of the brain is blocked. This can lead to sudden weakness, numbness, or difficulty speaking. Your CT scan was negative for a stroke, but at times a stroke will not immediately show up on this imaging, especially an ischemic stroke. Ct scans are great at detecting blood (hemorrhagic strokes), but less sensitive to early tissue damage from lack of blood flow (ischemic stroke). To treat the symptoms you were experiencing you were given a medication called Tenectaplase which is also referred to as a "clot busting" medication. This medication  is used to dissolve blood clots that can cause ischemic strokes, and is most effective when given within a few hours of stroke like symptoms. After receiving tenectaplase your symptoms resolved but needed to be closely monitored in the ICU. Your 24 hour repeat CT scan was also negative and is now recommended that you follow up with neurology and schedule an outpatient MRI      SECONDARY DISCHARGE DIAGNOSES  Diagnosis: Hypertension  Assessment and Plan of Treatment: While you were here your blood pressure was reported to be very high. Your were given IV medications to lower your blood pressure. We allow higher blood pressures after a stroke diagnosis in order to perfuse the brain but thats only within the first 24-48 hours. The goal after that time is <140/90, but your blood pressure remained above that. We started you on a medication called amlodopine 5mg  and losartan 25mg daily with the plan to monitor you and for you to follow up with your primary care doctor for further evaluation of hypertension

## 2025-04-23 NOTE — PROGRESS NOTE ADULT - SUBJECTIVE AND OBJECTIVE BOX
Interval Events:    HPI:  50 yo M with no known past medical history who presents to the ED with L arm/leg weakness and numbness that initially began around 6 PM this evening, resolved around 8PM, and then returned suddently around 9:30 PM and pt was unable to ambulate 2/2 to LLE weakness, so wife called EMS. Pt denies headache, neck pain/stiffness, visual changes, chest pain, SOB, difficulty speaking. On exam in the ED pt with L sided pronator drift, and difficulty with finger to nose on the left. Code stroke was called from triage, pt taken directly to CT scan and evaluation by Neuro outside CT. Presentation concerning for stroke. Family history significant for CVA.      CTH w/o acute pathology, CT perfusion w/o evidence of core infarction or penumbra, CTA head/neck no significant occlusion or stenosis or vascular abnormality. S/p TNK at 23:43, s/p IVP Labtalol 10 x2, now on Cardene gtt.      VS: Hypertensive up to 191/98, otherwise afebrile, HR 80s, normal RR, satting 100% on RA.    EKG:  NSR, possible LVF  (22 Apr 2025 00:21)      REVIEW OF SYSTEMS:    [ ] All  systems negative  [ ] Unable to assess ROS because ________    OBJECTIVE:  ICU Vital Signs Last 24 Hrs  T(C): 36.4 (23 Apr 2025 04:00), Max: 36.7 (22 Apr 2025 12:00)  T(F): 97.6 (23 Apr 2025 04:00), Max: 98.1 (22 Apr 2025 16:00)  HR: 63 (23 Apr 2025 04:00) (63 - 84)  BP: 155/94 (23 Apr 2025 04:00) (140/96 - 178/105)  BP(mean): 112 (23 Apr 2025 04:00) (92 - 128)  ABP: --  ABP(mean): --  RR: 17 (23 Apr 2025 04:00) (13 - 28)  SpO2: 98% (23 Apr 2025 04:00) (97% - 100%)    O2 Parameters below as of 23 Apr 2025 04:00  Patient On (Oxygen Delivery Method): room air              04-21 @ 07:01  -  04-22 @ 07:00  --------------------------------------------------------  IN: 100 mL / OUT: 880 mL / NET: -780 mL    04-22 @ 07:01  - 04-23 @ 06:40  --------------------------------------------------------  IN: 350 mL / OUT: 0 mL / NET: 350 mL      CAPILLARY BLOOD GLUCOSE      POCT Blood Glucose.: 170 mg/dL (21 Apr 2025 22:47)      Physical Exam:   Constitutional:  no acute distress   HEENT: + PERRLA, EOMI, no drainage or redness  Neck: supple,  No JVD  Respiratory: breath Sounds equal & clear bilaterally to auscultation, no accessory muscle use noted  Cardiovascular: Regular rate, regular rhythm, normal S1, S2; no murmurs or rub  Gastrointestinal: Soft, non-tender, non distended, no hepatosplenomegaly, normal bowel sounds  Extremities: no peripheral edema, no cyanosis, no clubbing   Vascular: Equal and normal pulses: 2+ peripheral pulses throughout  Neurological: alert and oriented   Psychiatric: calm   Musculoskeletal: No joint swelling or deformity; no limitation of movement  Skin: warm, dry, well perfused, no rashes      HOSPITAL MEDICATIONS:  Standing Meds:  aspirin enteric coated 81 milliGRAM(s) Oral daily  atorvastatin 80 milliGRAM(s) Oral at bedtime  chlorhexidine 2% Cloths 1 Application(s) Topical daily  heparin   Injectable 5000 Unit(s) SubCutaneous every 8 hours  influenza   Vaccine 0.5 milliLiter(s) IntraMuscular once      PRN Meds:      LABS:                        12.5   6.22  )-----------( 173      ( 23 Apr 2025 00:25 )             38.0     Hgb Trend: 12.5<--, 12.8<--, 12.6<--  04-23    137  |  101  |  14  ----------------------------<  191[H]  3.6   |  24  |  0.88    Ca    9.4      23 Apr 2025 00:25  Phos  4.2     04-23  Mg     1.90     04-23    TPro  6.8  /  Alb  4.1  /  TBili  0.5  /  DBili  x   /  AST  20  /  ALT  24  /  AlkPhos  74  04-23    Creatinine Trend: 0.88<--, 0.87<--, 1.15<--  PT/INR - ( 21 Apr 2025 23:00 )   PT: 10.9 sec;   INR: 0.94 ratio         PTT - ( 21 Apr 2025 23:00 )  PTT:31.2 sec  Urinalysis Basic - ( 23 Apr 2025 00:25 )    Color: x / Appearance: x / SG: x / pH: x  Gluc: 191 mg/dL / Ketone: x  / Bili: x / Urobili: x   Blood: x / Protein: x / Nitrite: x   Leuk Esterase: x / RBC: x / WBC x   Sq Epi: x / Non Sq Epi: x / Bacteria: x        Venous Blood Gas:  04-21 @ 23:00  7.40/49/54/30/83.7  VBG Lactate: 2.0      MICROBIOLOGY:     RADIOLOGY:  [ ] Reviewed and interpreted by me       Interval Events:  -no acute events reported overnight     HPI:  50 yo M with no known past medical history who presents to the ED with L arm/leg weakness and numbness that initially began around 6 PM this evening, resolved around 8PM, and then returned suddently around 9:30 PM and pt was unable to ambulate 2/2 to LLE weakness, so wife called EMS. Pt denies headache, neck pain/stiffness, visual changes, chest pain, SOB, difficulty speaking. On exam in the ED pt with L sided pronator drift, and difficulty with finger to nose on the left. Code stroke was called from triage, pt taken directly to CT scan and evaluation by Neuro outside CT. Presentation concerning for stroke. Family history significant for CVA.      CTH w/o acute pathology, CT perfusion w/o evidence of core infarction or penumbra, CTA head/neck no significant occlusion or stenosis or vascular abnormality. S/p TNK at 23:43, s/p IVP Labtalol 10 x2, now on Cardene gtt.      VS: Hypertensive up to 191/98, otherwise afebrile, HR 80s, normal RR, satting 100% on RA.    EKG:  NSR, possible LVF  (22 Apr 2025 00:21)      REVIEW OF SYSTEMS:    [ x] All  systems negative  [ ] Unable to assess ROS because ________    OBJECTIVE:  ICU Vital Signs Last 24 Hrs  T(C): 36.4 (23 Apr 2025 04:00), Max: 36.7 (22 Apr 2025 12:00)  T(F): 97.6 (23 Apr 2025 04:00), Max: 98.1 (22 Apr 2025 16:00)  HR: 63 (23 Apr 2025 04:00) (63 - 84)  BP: 155/94 (23 Apr 2025 04:00) (140/96 - 178/105)  BP(mean): 112 (23 Apr 2025 04:00) (92 - 128)  ABP: --  ABP(mean): --  RR: 17 (23 Apr 2025 04:00) (13 - 28)  SpO2: 98% (23 Apr 2025 04:00) (97% - 100%)    O2 Parameters below as of 23 Apr 2025 04:00  Patient On (Oxygen Delivery Method): room air              04-21 @ 07:01  -  04-22 @ 07:00  --------------------------------------------------------  IN: 100 mL / OUT: 880 mL / NET: -780 mL    04-22 @ 07:01  -  04-23 @ 06:40  --------------------------------------------------------  IN: 350 mL / OUT: 0 mL / NET: 350 mL      CAPILLARY BLOOD GLUCOSE      POCT Blood Glucose.: 170 mg/dL (21 Apr 2025 22:47)      Physical Exam:   Constitutional:  no acute distress   HEENT: + PERRLA, EOMI, no drainage or redness  Neck: supple,  No JVD  Respiratory: breath Sounds equal & clear bilaterally to auscultation, no accessory muscle use noted  Cardiovascular: Regular rate, regular rhythm, normal S1, S2; no murmurs or rub  Gastrointestinal: Soft, non-tender, non distended, no hepatosplenomegaly, normal bowel sounds  Extremities: no peripheral edema, no cyanosis, no clubbing   Vascular: Equal and normal pulses: 2+ peripheral pulses throughout  Neurological: alert and oriented   Psychiatric: calm   Musculoskeletal: No joint swelling or deformity; no limitation of movement  Skin: warm, dry, well perfused, no rashes      HOSPITAL MEDICATIONS:  Standing Meds:  aspirin enteric coated 81 milliGRAM(s) Oral daily  atorvastatin 80 milliGRAM(s) Oral at bedtime  chlorhexidine 2% Cloths 1 Application(s) Topical daily  heparin   Injectable 5000 Unit(s) SubCutaneous every 8 hours  influenza   Vaccine 0.5 milliLiter(s) IntraMuscular once      PRN Meds:      LABS:                        12.5   6.22  )-----------( 173      ( 23 Apr 2025 00:25 )             38.0     Hgb Trend: 12.5<--, 12.8<--, 12.6<--  04-23    137  |  101  |  14  ----------------------------<  191[H]  3.6   |  24  |  0.88    Ca    9.4      23 Apr 2025 00:25  Phos  4.2     04-23  Mg     1.90     04-23    TPro  6.8  /  Alb  4.1  /  TBili  0.5  /  DBili  x   /  AST  20  /  ALT  24  /  AlkPhos  74  04-23    Creatinine Trend: 0.88<--, 0.87<--, 1.15<--  PT/INR - ( 21 Apr 2025 23:00 )   PT: 10.9 sec;   INR: 0.94 ratio         PTT - ( 21 Apr 2025 23:00 )  PTT:31.2 sec  Urinalysis Basic - ( 23 Apr 2025 00:25 )    Color: x / Appearance: x / SG: x / pH: x  Gluc: 191 mg/dL / Ketone: x  / Bili: x / Urobili: x   Blood: x / Protein: x / Nitrite: x   Leuk Esterase: x / RBC: x / WBC x   Sq Epi: x / Non Sq Epi: x / Bacteria: x        Venous Blood Gas:  04-21 @ 23:00  7.40/49/54/30/83.7  VBG Lactate: 2.0      MICROBIOLOGY:     RADIOLOGY:  [ ] Reviewed and interpreted by me

## 2025-04-23 NOTE — PROGRESS NOTE ADULT - ASSESSMENT
Assessment/Summar  -49 right handed no pmhx  -presents late 4/21/25 with left weakness and gait difficulty  -NIHSS 4 for left ataxic hemiparesis. TNK given 4/21/25 23:43 (delay due to elevated BP)  -Subsequently improved, NIHSS now 0  -CTH/CTA/CTP in code unrevealing. Stability scan at 24 hours stable  -a1c 6.3%, tchol 235, , , HDL  39. 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 Pt prefers to be done on outpatient basis   -Target chronic normotension BP goal <140/90  -Target chronic euglycemia. A1C 6.3 (prediabetes), aim for <5.7  -While admitted: stroke neurochecks and vitals q4, DVT ppx w/ SCDs and enoxaparin   -Stroke education and counseling  -No further inpatient/emergent neurological workup/treatment changes indicated. No objections to discharge if no further plans for BP med adjustments.

## 2025-04-23 NOTE — DISCHARGE NOTE PROVIDER - NSDCMRMEDTOKEN_GEN_ALL_CORE_FT
amLODIPine 5 mg oral tablet: 1 tab(s) orally once a day  aspirin 81 mg oral capsule: 1 cap(s) orally once a day  atorvastatin 80 mg oral tablet: 1 tab(s) orally once a day (at bedtime)   amLODIPine 5 mg oral tablet: 1 tab(s) orally once a day  aspirin 81 mg oral capsule: 1 cap(s) orally once a day  atorvastatin 80 mg oral tablet: 1 tab(s) orally once a day (at bedtime)  losartan 25 mg oral tablet: 1 tab(s) orally once a day   amLODIPine 5 mg oral tablet: 1 tab(s) orally once a day  aspirin 81 mg oral capsule: 1 cap(s) orally once a day  atorvastatin 80 mg oral tablet: 1 tab(s) orally once a day (at bedtime)  fluticasone 50 mcg/inh nasal spray: 1 spray(s) nasal 2 times a day  losartan 25 mg oral tablet: 1 tab(s) orally once a day  MRI Head With and Without Contrast: MRI Head With and Without Contrast

## 2025-04-23 NOTE — DISCHARGE NOTE PROVIDER - NSFOLLOWUPCLINICS_GEN_ALL_ED_FT
[facility]  Neurology  [address] [place] [address] [address] [place] [address]  Phone: [phone]  Fax:

## 2025-04-23 NOTE — PROGRESS NOTE ADULT - CRITICAL CARE ATTENDING COMMENT
L ataxic hemiparesis, s/p tnk, now resolved  Suspect small vessel disease, may have small lacune not well visualized on CTH  Will do MRI outpatient as to not delay discharge  rest of plan as above

## 2025-04-23 NOTE — PROGRESS NOTE ADULT - ASSESSMENT
48 yo M with no known past medical history who presents to the ED with LUE/LLE weakness and numbness c/f CVA, s/p TNK, admitted to MICU for closer monitoring and q1h neurochecks.     =====NEURO=====  #Acute CVA vs. TIA s/p TNK    -presented with LLE weakness and difficulty ambulating. Stroke code called and CTH negative S/p TNK  4/21 at 23:43  -CTH: no acute intracranial pathology, possible mild microangiopathic ischemic changes   -CT perfusion: no evidence of core infarction or penumbra   -CTA head/neck: no significant occlusion or stenosis or vascular abnormality   - Neurochecks q4h   - Neuro following   - Started patient on ASA 81mg and hep SQ for DVT ppx as per neuro   - atorvastatin started  - Repeat CTH 24hrs after TNK administration negative  - MRI brain w/ and w/o patient endorsed claustrophobia neuro okay with outpatient MRI   - PT/OT in place     =====PULMONARY=====  On RA, satting well. No active issues.      =====CARDIOVASCULAR=====  #HTN    -Hypertensive in ED requiring labetalol IVPx2 currently no requiring cardene gtt  - Goal BP <180/105 for at least 24hrs after TNK    - TTE w/ bubble study: Left ventricular systolic function is normal with an ejection fraction of 64 %; neg bubble study    =====GI=====  #Diet  -No active issues  -passed speech and swallow   -will start diet        =====RENAL/======   No active issues  -voiding without any issues     =====ENDOCRINE=====   No active issues  -A1c 6.3  -no need for sliding scale at this time      =====INFECTIOUS DISEASE=====  No active issues.      =====HEME/ONC/DVT PPX=====  #Microcytic Anemia   Hgb 12.6 w/ MCV 75.8, no evidence of bleeding  - Send iron studies  - Monitor CBC  - Maintain active T&S, transfuse for Hgb < 7    -DVT ppx: hep SQ      =====GOC=====  -full code  -wife involved in care    48 yo M with no known past medical history who presents to the ED with LUE/LLE weakness and numbness c/f CVA, s/p TNK, admitted to MICU for closer monitoring and q1h neurochecks.     =====NEURO=====  #Acute CVA vs. TIA s/p TNK    -presented with LLE weakness and difficulty ambulating. Stroke code called and CTH negative S/p TNK  4/21 at 23:43  -CTH: no acute intracranial pathology, possible mild microangiopathic ischemic changes   -CT perfusion: no evidence of core infarction or penumbra   -CTA head/neck: no significant occlusion or stenosis or vascular abnormality   - Neurochecks q4h   - Neuro following   -c/w ASA 81mg and atorvastatin 80mg QHS  - Repeat CTH 24hrs after TNK administration negative  - MRI brain w/ and w/o patient endorsed claustrophobia as per neuro may not need mri   - PT/OT following, no need for rehab ambulating without any issues     =====PULMONARY=====  On RA, satting well. No active issues.      =====CARDIOVASCULAR=====  #HTN    -Hypertensive in ED requiring labetalol IVPx2 currently no requiring cardene gtt  - Goal BP <180/105 for at least 24hrs after TNK    - TTE w/ bubble study: Left ventricular systolic function is normal with an ejection fraction of 64 %; neg bubble study    =====GI=====  #Diet  -No active issues  -passed speech and swallow   -tolerating diet       =====RENAL/======   No active issues  -voiding without any issues     =====ENDOCRINE=====   No active issues  -A1c 6.3  -no need for sliding scale at this time      =====INFECTIOUS DISEASE=====  No active issues.      =====HEME/ONC/DVT PPX=====  #Microcytic Anemia   Hgb 12.6 w/ MCV 75.8, no evidence of bleeding  - Send iron studies  - Monitor CBC  - Maintain active T&S, transfuse for Hgb < 7    -DVT ppx: hep SQ      =====GOC=====  -full code  -wife involved in care    50 yo M with no known past medical history who presents to the ED with LUE/LLE weakness and numbness c/f CVA, s/p TNK, admitted to MICU for closer monitoring and q1h neurochecks.     =====NEURO=====  #Acute CVA vs. TIA s/p TNK    -presented with LLE weakness and difficulty ambulating. Stroke code called and CTH negative S/p TNK  4/21 at 23:43  -CTH: no acute intracranial pathology, possible mild microangiopathic ischemic changes   -CT perfusion: no evidence of core infarction or penumbra   -CTA head/neck: no significant occlusion or stenosis or vascular abnormality   - Neurochecks q4h   - Neuro following   -c/w ASA 81mg and atorvastatin 80mg QHS  - Repeat CTH 24hrs after TNK administration negative  - MRI brain w/ and w/o patient endorsed claustrophobia as per neuro okay to complete outpatient   - PT/OT following, no need for rehab ambulating without any issues     =====PULMONARY=====  On RA, satting well. No active issues.      =====CARDIOVASCULAR=====  #HTN    -Hypertensive in ED requiring labetalol IVPx2 currently no requiring cardene gtt  - Goal BP <180/105 for at least 24hrs after TNK goal now is <140/90  -still remains hypertensive, will start amlodipine 5mg daily   - TTE w/ bubble study: Left ventricular systolic function is normal with an ejection fraction of 64 %; neg bubble study    =====GI=====  #Diet  -No active issues  -passed speech and swallow   -tolerating diet       =====RENAL/======   No active issues  -voiding without any issues     =====ENDOCRINE=====   No active issues  -A1c 6.3  -no need for sliding scale at this time      =====INFECTIOUS DISEASE=====  No active issues.      =====HEME/ONC/DVT PPX=====  #Microcytic Anemia   Hgb 12.6 w/ MCV 75.8, no evidence of bleeding  - Send iron studies  - Monitor CBC  - Maintain active T&S, transfuse for Hgb < 7    -DVT ppx: hep SQ      =====GOC=====  -full code  -wife involved in care  -plan for possible D/C this evening or tomorrow if BP controlled

## 2025-04-23 NOTE — DISCHARGE NOTE PROVIDER - HOSPITAL COURSE
"CC:   Medicare Annual Wellness Visit    HPI:  Angela is a 71 y.o. female here for her Medicare Annual Wellness Visit. She denies new health concerns since our last visit together. She had fasting labs done 2/26/2020 and would like to review results with me today. She has chronic acquired hypothyroidism with daily Synthroid use, and chronic essential HTN well controlled with daily lisinopril use. She continues to take Vitamin D3 and calcium for history of osteopenia, and history of autoimmune hepatitis well controlled with Azathioprine managed by Dr. Gavin. She has history of PSVT/mitral regurgitation managed by [facility] and continues to struggle with diffuse chronic OA. She was provided with [employer] information today, endorses 100% medication compliance, and is in a very happy mood today.     Patient Active Problem List    Diagnosis Date Noted   • Generalized OA 04/12/2019   • Elevated hemoglobin A1c 09/26/2018   • Essential hypertension 02/12/2018   • Osteopenia of multiple sites 05/17/2017   • Acquired hypothyroidism 05/17/2017   • Autoimmune hepatitis (CMS-HCC) 10/10/2014   • Mitral regurgitation 10/10/2014   • Paroxysmal supraventricular tachycardia, details unknow - dx 3-5 y ago 04/19/2013     Current Outpatient Medications   Medication Sig Dispense Refill   • Diclofenac Sodium (VOLTAREN) 1 % Gel 4 g by Apply externally route 2 Times a Day. 8 Tube 6   • cyanocobalamin (VITAMIN B-12) 1000 MCG/ML Solution 1 mL by Intramuscular route every 7 days for 4 doses. 4 mL 3   • Syringe/Needle, Disp, (SYRINGE 3CC/25GX1\") 25G X 1\" 3 ML Misc Use as directed for B12 injections. 10 Each 3   • DILTIAZem CD (CARTIA XT) 120 MG CAPSULE SR 24 HR Take 1 Cap by mouth every day. 30 Cap 3   • estradiol (ESTRACE) 1 MG Tab Take 1 Tab by mouth every day. 90 Tab 4   • montelukast (SINGULAIR) 10 MG Tab Take 1 Tab by mouth every day. 90 Tab 4   • levothyroxine (SYNTHROID) 125 MCG Tab Take 1 Tab by mouth Every morning on an empty " stomach. 90 Tab 4   • Azathioprine 75 MG Tab Take 1 Tab by mouth every day. 90 Tab 4   • aspirin EC (ECOTRIN) 81 MG Tablet Delayed Response Take 1 Tab by mouth every day. 90 Tab 4   • fexofenadine (ALLEGRA) 180 MG tablet Take 180 mg by mouth 1 time daily as needed. Indications: Hayfever     • Calcium Carb-Cholecalciferol (CALCIUM 1000 + D) 1000-800 MG-UNIT TABS Take 1 Tab by mouth every day.     • docosahexanoic acid (OMEGA 3 FA) 1000 MG CAPS Take 1,000 mg by mouth 2 Times a Day.       No current facility-administered medications for this visit.       Current supplements: see MAR  Chronic narcotic pain medicines: no  Allergies: Statins [hmg-coa-r inhibitors]; Chlordiazepoxide hcl; Codeine; Hydrocodone; Librium; Lopressor [kdc:ci pigment blue 63+metoprolol]; and Sulphadimidine sodium [sulfamethazine sodium]  Exercise: yes  Current social contact/activities: yes  Current mood: good  Advance Directive on file: yes    Screening:  Depression Screening    Little interest or pleasure in doing things?  0 - not at all  Feeling down, depressed , or hopeless? 0 - not at all  Patient Health Questionnaire Score: 0     Screening for Cognitive Impairment    Three Minute Recall (village, kitchen, baby) 3/3    [place] clock face with all 12 numbers and set the hands to show 10 past 10.  Yes    Cognitive concerns identified deferred for follow up unless specifically addressed in assessment and plan.    Fall Risk Assessment    Has the patient had two or more falls in the last year or any fall with injury in the last year?  No    Safety Assessment    Throw rugs on floor.  No  Handrails on all stairs.  Yes  Good lighting in all hallways.  Yes  Difficulty hearing.  No  Patient counseled about all safety risks that were identified.    Functional Assessment ADLs    Are there any barriers preventing you from cooking for yourself or meeting nutritional needs?  No.    Are there any barriers preventing you from driving safely or obtaining  transportation?  No.    Are there any barriers preventing you from using a telephone or calling for help?  No.    Are there any barriers preventing you from shopping?  No.    Are there any barriers preventing you from taking care of your own finances?  No.    Are there any barriers preventing you from managing your medications?  No.    Are there any barriers preventing you from showering, bathing or dressing yourself?  No.    Are you currently engaging in any exercise or physical activity?  Yes.     What is your perception of your health?  Good.      Health Maintenance Summary                IMM ZOSTER VACCINES Overdue 9/13/1996     MAMMOGRAM Next Due 1/13/2021      Done 1/13/2020 MA-SCREENING MAMMO BILAT W/TOMOSYNTHESIS W/CAD     Patient has more history with this topic...    COLONOSCOPY Next Due 2/17/2021      Done 2/17/2016 REFERRAL TO GI FOR COLONOSCOPY    Annual Wellness Visit Next Due 3/3/2021      Done 3/2/2020 Visit Dx: Encounter for Medicare annual wellness exam     Patient has more history with this topic...    BONE DENSITY Next Due 9/14/2022      Done 9/14/2017 DS-BONE DENSITY STUDY (DEXA)     Patient has more history with this topic...    IMM DTaP/Tdap/Td Vaccine Next Due 9/14/2027      Done 9/14/2017 Imm Admin: Tdap Vaccine          Patient Care Team:  Enedelia Bourne M.D. as PCP - General (Family Medicine)      Social History     Tobacco Use   • Smoking status: Never Smoker   • Smokeless tobacco: Never Used   Substance Use Topics   • Alcohol use: No   • Drug use: No     Family History   Problem Relation Age of Onset   • Heart Disease Mother    • Cancer Mother    • Heart Disease Father    • Cancer Father    • Heart Disease Sister    • Heart Attack Sister    • Heart Disease Brother    • Heart Attack Brother    • Heart Disease Brother    • Heart Attack Brother    • Diabetes Child    • Psychiatric Illness Child         hodgkins survivor     She  has a past medical history of Autoimmune hepatitis (HCC),  "B12 deficiency, Congenital malrotation of intestine, Fatigue (5/22/2013), Other chest pain (4/19/2013), Paroxysmal supraventricular tachycardia, details unknow - dx 3-5 y ago (4/19/2013), and Thyroid disease.   Past Surgical History:   Procedure Laterality Date   • LUMPECTOMY  1982    right breast   • ABDOMINAL HYSTERECTOMY TOTAL  1970   • HERNIA REPAIR  1970    umbilical   • TONSILLECTOMY  1954   • APPENDECTOMY  1950   • OTHER ABDOMINAL SURGERY  1965-66    congenital malrotation of intestines   • PRIMARY C SECTION  1968/1970       ROS:    All positives noted in HPI. All others reviewed and are negative.    Ostomy or other tubes or amputations: no  Chronic oxygen use: no  Last eye exam: UTD per pt report  : denies urinary incontinence; does not interfere with ADLs/ sleep  Gait: stable  Problems with balance/ difficulty walking: no  Hearing: adequate  Dentition: adequate    Exam:   /61 (BP Location: Left arm, Patient Position: Sitting, BP Cuff Size: Adult)   Pulse 71   Temp 37 °C (98.6 °F) (Temporal)   Resp 17   Ht 1.6 m (5' 3\")   Wt 57.2 kg (126 lb 3.2 oz)   SpO2 93%  Body mass index is 22.36 kg/m².    Gen: Well developed; well nourished; no acute distress; age appropriate appearance   HEENT: Normocephalic; atraumatic; PEERLA b/l; sclera clear b/l; b/l external auditory canals WNL; R cerumen impaction; L TM WNL; nares patent; oropharynx clear; oral mucosa moist; tongue midline; dentition adequate   Neck: No adenopathy; no thyromegaly  CV: Regular rate and rhythm; S1/ S2 present; no murmur, gallop or rub noted  Pulm: No respiratory distress; clear to ascultation b/l; no wheezing or stridor noted b/l  Abd: Adequate bowel sounds noted; soft and nontender; no rebound, rigidity, nor distention  Extremities: No peripheral edema b/l LE extremities/ no clubbing nor cyanosis noted  Skin: Warm and dry; no rashes noted   Neuro: No focal deficits noted; pt is able to get up out of chair unassisted and walk " "forward  Psych: AAOx4; mood and affect are appropriate    Lab results 2/26/2020 reviewed with patient at visit today.     Assessment and Plan:  1. Generalized OA  Stable but ongoing issue for patient controlled with PRN Voltaren and Salon Pas use. New RX sent to pharmacy.   - Diclofenac Sodium (VOLTAREN) 1 % Gel; 4 g by Apply externally route 2 Times a Day.  Dispense: 8 Tube; Refill: 6    2. B12 deficiency  Uncontrolled - B12 level is now 175 - patient has had prior issues with this in the past. Recommend B12 injection weekly for 4 weeks and then repeat B12 lab test for further medical management. New RX sent to pharmacy, and home injection education provided by Aruna BROCK.   - cyanocobalamin (VITAMIN B-12) 1000 MCG/ML Solution; 1 mL by Intramuscular route every 7 days for 4 doses.  Dispense: 4 mL; Refill: 3  - Syringe/Needle, Disp, (SYRINGE 3CC/25GX1\") 25G X 1\" 3 ML Misc; Use as directed for B12 injections.  Dispense: 10 Each; Refill: 3  - VITAMIN B12; Future    3. Acquired hypothyroidism  Uncontrolled - TSH is now 6.39 (patient has had elevated TSH in past). Recommend patient continue current Synthroid daily dose and will recheck labs in 4 weeks.   - TSH; Future  - FREE THYROXINE; Future  - T3 FREE; Future    4. Autoimmune hepatitis (CMS-HCC)  Stable/ pt is to continue current Azathioprine use managed by Dr. Gavin.     5. Mitral valve insufficiency, unspecified etiology  Stable/ followed by [facility].     6. Osteopenia of multiple sites  Stable/ pt is to continue daily Vitamin D and calcium intake/ continue fall prevention strategies.     7. Essential hypertension  Stable/ pt is to continue current daily medication managed by [facility].     8. Impacted cerumen of right ear  Patient cannot tolerate ear flush historically so recommend using Debrox nightly in right ear for the next week.     9. Elevated hemoglobin A1c  Stable/ HgA1c is stable at 5.8% and patient encouraged to continue diet and " exercise efforts.     10. Vaccine counseling  Patient provided with [employer] information at visit today, and she will obtain vaccines at pharmacy per Medicare guidelines.     11. Encounter for Medicare annual wellness exam  Patient is stable at this time and is well informed about chronic health care needs moving forward.        Services needed: no new services needed at this time  Health Care Screening: recommendations as per orders if indicated.  Referrals offered: none  Counseling provided today:  · Prevent falls and reduce trip hazards; Secure or remove rugs if present   · Maintain working fire alarm and carbon monoxide detectors   · Engage in regular physical activity daily and social activities weekly as tolerated      48 y/o M with no known past medical history who presents to the ED with L arm/leg weakness and numbness that initially began around 6 PM this evening, resolved around 8PM, and then returned suddenly around 9:30 PM and pt was unable to ambulate 2/2 to LLE weakness, so wife called EMS. Pt denies headache, neck pain/stiffness, visual changes, chest pain, SOB, difficulty speaking. On exam in the ED pt with L sided pronator drift, and difficulty with finger to nose on the left. Code stroke was called from triage, pt taken directly to CT scan and evaluation by Neuro outside CT. Presentation concerning for stroke. Family history significant for CVA.  CTH w/o acute pathology, CT perfusion w/o evidence of core infarction or penumbra, CTA head/neck with no significant occlusion or stenosis or vascular abnormality. S/p TNK at 23:43 on 4/21, s/p IVP. Delay in given TNP for hypertension requiring Labetalol 10 x2, and briefly on cardene gtt. Patient admitted to MICU for q1h neuro checks s/p TNK administration. MICU course uncomplicated and 24h scan w/o acute intracranial hemorrhage, mass effect, or shift of the midline structures. Patient reports resolution of symptoms after TNK and there are currently no focal findings on neuro exam. Patient able to eat and tolerates PO diet. Currently saturating 100% on RA. Patient currently w/ permissive hypertension but now remains with SBP >150/100 with goal blood pressure as per neurology to be <140/90. Started amlodipine 5mg PO daily on 4/23 with plan for follow up for blood pressure and outpatient open MRI due to claustrophobia

## 2025-04-24 VITALS
RESPIRATION RATE: 16 BRPM | SYSTOLIC BLOOD PRESSURE: 144 MMHG | HEART RATE: 66 BPM | TEMPERATURE: 97 F | OXYGEN SATURATION: 98 % | DIASTOLIC BLOOD PRESSURE: 98 MMHG

## 2025-04-24 LAB
ALBUMIN SERPL ELPH-MCNC: 3.9 G/DL — SIGNIFICANT CHANGE UP (ref 3.3–5)
ALP SERPL-CCNC: 73 U/L — SIGNIFICANT CHANGE UP (ref 40–120)
ALT FLD-CCNC: 25 U/L — SIGNIFICANT CHANGE UP (ref 4–41)
ANION GAP SERPL CALC-SCNC: 15 MMOL/L — HIGH (ref 7–14)
AST SERPL-CCNC: 17 U/L — SIGNIFICANT CHANGE UP (ref 4–40)
BASOPHILS # BLD AUTO: 0.07 K/UL — SIGNIFICANT CHANGE UP (ref 0–0.2)
BASOPHILS NFR BLD AUTO: 1.2 % — SIGNIFICANT CHANGE UP (ref 0–2)
BILIRUB SERPL-MCNC: 0.3 MG/DL — SIGNIFICANT CHANGE UP (ref 0.2–1.2)
BUN SERPL-MCNC: 13 MG/DL — SIGNIFICANT CHANGE UP (ref 7–23)
CALCIUM SERPL-MCNC: 9.1 MG/DL — SIGNIFICANT CHANGE UP (ref 8.4–10.5)
CHLORIDE SERPL-SCNC: 101 MMOL/L — SIGNIFICANT CHANGE UP (ref 98–107)
CO2 SERPL-SCNC: 19 MMOL/L — LOW (ref 22–31)
CREAT SERPL-MCNC: 0.77 MG/DL — SIGNIFICANT CHANGE UP (ref 0.5–1.3)
EGFR: 108 ML/MIN/1.73M2 — SIGNIFICANT CHANGE UP
EGFR: 108 ML/MIN/1.73M2 — SIGNIFICANT CHANGE UP
EOSINOPHIL # BLD AUTO: 0.41 K/UL — SIGNIFICANT CHANGE UP (ref 0–0.5)
EOSINOPHIL NFR BLD AUTO: 6.9 % — HIGH (ref 0–6)
GLUCOSE SERPL-MCNC: 112 MG/DL — HIGH (ref 70–99)
HCT VFR BLD CALC: 38.9 % — LOW (ref 39–50)
HGB BLD-MCNC: 12.7 G/DL — LOW (ref 13–17)
IANC: 2.47 K/UL — SIGNIFICANT CHANGE UP (ref 1.8–7.4)
IMM GRANULOCYTES NFR BLD AUTO: 0.2 % — SIGNIFICANT CHANGE UP (ref 0–0.9)
LYMPHOCYTES # BLD AUTO: 2.44 K/UL — SIGNIFICANT CHANGE UP (ref 1–3.3)
LYMPHOCYTES # BLD AUTO: 41.4 % — SIGNIFICANT CHANGE UP (ref 13–44)
MAGNESIUM SERPL-MCNC: 2 MG/DL — SIGNIFICANT CHANGE UP (ref 1.6–2.6)
MCHC RBC-ENTMCNC: 25.7 PG — LOW (ref 27–34)
MCHC RBC-ENTMCNC: 32.6 G/DL — SIGNIFICANT CHANGE UP (ref 32–36)
MCV RBC AUTO: 78.7 FL — LOW (ref 80–100)
MONOCYTES # BLD AUTO: 0.5 K/UL — SIGNIFICANT CHANGE UP (ref 0–0.9)
MONOCYTES NFR BLD AUTO: 8.5 % — SIGNIFICANT CHANGE UP (ref 2–14)
NEUTROPHILS # BLD AUTO: 2.47 K/UL — SIGNIFICANT CHANGE UP (ref 1.8–7.4)
NEUTROPHILS NFR BLD AUTO: 41.8 % — LOW (ref 43–77)
NRBC # BLD AUTO: 0 K/UL — SIGNIFICANT CHANGE UP (ref 0–0)
NRBC # FLD: 0 K/UL — SIGNIFICANT CHANGE UP (ref 0–0)
NRBC BLD AUTO-RTO: 0 /100 WBCS — SIGNIFICANT CHANGE UP (ref 0–0)
PHOSPHATE SERPL-MCNC: 3.7 MG/DL — SIGNIFICANT CHANGE UP (ref 2.5–4.5)
PLATELET # BLD AUTO: 170 K/UL — SIGNIFICANT CHANGE UP (ref 150–400)
POTASSIUM SERPL-MCNC: 4.1 MMOL/L — SIGNIFICANT CHANGE UP (ref 3.5–5.3)
POTASSIUM SERPL-SCNC: 4.1 MMOL/L — SIGNIFICANT CHANGE UP (ref 3.5–5.3)
PROT SERPL-MCNC: 6.7 G/DL — SIGNIFICANT CHANGE UP (ref 6–8.3)
RBC # BLD: 4.94 M/UL — SIGNIFICANT CHANGE UP (ref 4.2–5.8)
RBC # FLD: 14.1 % — SIGNIFICANT CHANGE UP (ref 10.3–14.5)
SODIUM SERPL-SCNC: 135 MMOL/L — SIGNIFICANT CHANGE UP (ref 135–145)
WBC # BLD: 5.9 K/UL — SIGNIFICANT CHANGE UP (ref 3.8–10.5)
WBC # FLD AUTO: 5.9 K/UL — SIGNIFICANT CHANGE UP (ref 3.8–10.5)

## 2025-04-24 PROCEDURE — 99232 SBSQ HOSP IP/OBS MODERATE 35: CPT

## 2025-04-24 RX ORDER — LOSARTAN POTASSIUM 100 MG/1
25 TABLET, FILM COATED ORAL DAILY
Refills: 0 | Status: DISCONTINUED | OUTPATIENT
Start: 2025-04-24 | End: 2025-04-24

## 2025-04-24 RX ORDER — LOSARTAN POTASSIUM 100 MG/1
1 TABLET, FILM COATED ORAL
Qty: 30 | Refills: 0
Start: 2025-04-24

## 2025-04-24 RX ORDER — FLUTICASONE PROPIONATE 50 UG/1
1 SPRAY, METERED NASAL
Qty: 0 | Refills: 0 | DISCHARGE
Start: 2025-04-24

## 2025-04-24 RX ADMIN — Medication 1 APPLICATION(S): at 11:08

## 2025-04-24 RX ADMIN — Medication 81 MILLIGRAM(S): at 11:08

## 2025-04-24 RX ADMIN — LOSARTAN POTASSIUM 25 MILLIGRAM(S): 100 TABLET, FILM COATED ORAL at 09:04

## 2025-04-24 RX ADMIN — HEPARIN SODIUM 5000 UNIT(S): 1000 INJECTION INTRAVENOUS; SUBCUTANEOUS at 06:33

## 2025-04-24 RX ADMIN — AMLODIPINE BESYLATE 5 MILLIGRAM(S): 10 TABLET ORAL at 06:18

## 2025-04-24 NOTE — PROGRESS NOTE ADULT - ASSESSMENT
Assessment/Summary  -49 right handed no pmhx  -presents late 4/21/25 with left weakness and gait difficulty  -NIHSS 4 for left ataxic hemiparesis. TNK given 4/21/25 23:43 (delay due to elevated BP)  -Subsequently improved, NIHSS now still 0  -CTH/CTA/CTP in code unrevealing. Stability scan at 24 hours stable  -a1c 6.3%, tchol 235, , , HDL  39. 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 Pt prefers to be done on outpatient basis   -Target chronic normotension BP goal <140/90  -Target chronic euglycemia. A1C 6.3 (prediabetes), aim for <5.7  -While admitted: stroke neurochecks and vitals q4, DVT ppx w/ SCDs and enoxaparin   -Stroke education and counseling  -Patient can follow up with Dr. Kathryn ashraf after discharge. Appointment alreayd made for next week. Office number  [phone]  Office is located at [address] [place] [address].   -No further inpatient/emergent neurological workup/treatment changes indicated. Neurology will sign off. Please call with any questions or concerns.  Thank you. No objections to discharge if no further plans for BP med adjustments.

## 2025-04-24 NOTE — DISCHARGE NOTE NURSING/CASE MANAGEMENT/SOCIAL WORK - NSDCPEFALRISK_GEN_ALL_CORE
For information on Fall & Injury Prevention, visit: [email or web address] OR  [email or web address] OR  https://www.cdc.gov/steadi/patient.html

## 2025-04-24 NOTE — DISCHARGE NOTE NURSING/CASE MANAGEMENT/SOCIAL WORK - FINANCIAL ASSISTANCE
[facility] provides services at a reduced cost to those who are determined to be eligible through [facility]’s financial assistance program. Information regarding [facility]’s financial assistance program can be found by going to [email or web address] or by calling [phone].

## 2025-04-24 NOTE — PROGRESS NOTE ADULT - SUBJECTIVE AND OBJECTIVE BOX
~~~~~~~~~~~~~~~~~~~~~~~~~~~~~~~~~~~~~~~~~~~~~~~~~~  Neurology Service   [facility] Stroke Consult Service , [phone] (7a-7p, otherwise call [phone])  ~~~~~~~~~~~~~~~~~~~~~~~~~~~~~~~~~~~~~~~~~~~~~~~~~~  History:  Interval History:  No adverse events over night  doing well  walking  symptoms resolved     Review of Systems:  no new symptoms     Medications  Home Medications:  fluticasone 50 mcg/inh nasal spray: 1 spray(s) nasal 2 times a day (24 Apr 2025 10:40)    MEDICATIONS  (STANDING):  amLODIPine   Tablet 5 milliGRAM(s) Oral daily  aspirin enteric coated 81 milliGRAM(s) Oral daily  atorvastatin 80 milliGRAM(s) Oral at bedtime  chlorhexidine 2% Cloths 1 Application(s) Topical daily  fluticasone propionate 50 MICROgram(s)/spray Nasal Spray 1 Spray(s) Both Nostrils two times a day  heparin   Injectable 5000 Unit(s) SubCutaneous every 8 hours  influenza   Vaccine 0.5 milliLiter(s) IntraMuscular once  losartan 25 milliGRAM(s) Oral daily    MEDICATIONS  (PRN):      Exam:  Vitals:  Vital Signs Last 24 Hrs  T(C): 36.1 (04-24-25 @ 08:02), Max: 36.7 (04-24-25 @ 00:00)  T(F): 97 (04-24-25 @ 08:02), Max: 98 (04-24-25 @ 00:00)  HR: 66 (04-24-25 @ 08:02) (64 - 78)  BP: 144/98 (04-24-25 @ 08:02) (131/82 - 164/95)  BP(mean): 113 (04-24-25 @ 08:02) (97 - 113)  RR: 16 (04-24-25 @ 08:02) (14 - 20)  SpO2: 98% (04-24-25 @ 08:02) (95% - 99%)      I&O's Summary    23 Apr 2025 07:01  -  24 Apr 2025 07:00  --------------------------------------------------------  IN: 660 mL / OUT: 0 mL / NET: 660 mL    Physical and Neurological Examination:   - General:- General: Sat in bed, well appearing    - Mental Status:   awake, alert, oriented, conversational  speech clear and fluent  asks appropriate questions    - Cranial Nerves:   EOM grossly intact  no facial / symmetric smile    - Motor Testing:  No pronator drift     - Coordination: Finger-nose-finger intact without dysmetria.    Objective Studies  Labs:             12.7   5.90  )-----------( 170      ( 24 Apr 2025 06:36 )             38.9   135  |  101  |  13  ----------------------------<  112[H]  4.1   |  19[L]  |  0.77  Ca    9.1      24 Apr 2025 06:36  Phos  3.7     04-24  Mg     2.00     04-24  TPro  6.7  /  Alb  3.9  /  TBili  0.3  /  DBili  x   /  AST  17  /  ALT  25  /  AlkPhos  73  04-24    Stroke labs  a1c 6.3%  tchol 235      HDL 39    CTH 4/21/25 2300  FINDINGS:  VENTRICLES AND SULCI: Normal in size and configuration.  INTRA-AXIAL: No mass effect, acute hemorrhage, or midline shift.    Suggestion of mild microangiopathic ischemic change.  EXTRA-AXIAL: No mass or fluid collection. Basal cisterns are normal in appearance.  VISUALIZED SINUSES:  Scattered mucosal thickening, Mild-to-moderate left ethmoid air cells posteriorly.  TYMPANOMASTOID CAVITIES:  Clear.  VISUALIZED ORBITS: Normal.  CALVARIUM: Intact.  MISCELLANEOUS: None.  IMPRESSION:  No evidence of acute intracranial pathology.  MRI could be considered if clinically warranted.  Left ethmoidal sinus disease.    CTH/CTA 4/21/25 2300  IMPRESSION:    CT PERFUSION:  No evidence of core infarction or penumbra.    CTA NECK:  No evidence of significant stenosis or occlusion.    CTA HEAD:  No large vessel occlusion, significant stenosis or vascular abnormality   identified.    CTH 4/22/25 22:54  FINDINGS: There is no acute intracranial hemorrhage, masseffect, shift   of the midline structures, herniation, extra-axial fluid collection, or   hydrocephalus.    Minimal scattered mucosal thickening is seen throughout the paranasal   sinuses. The bilateral tympanomastoid cavities are clear. The orbits are   unremarkable. The calvarium is intact.    IMPRESSION: No acute intracranial hemorrhage, mass effect, or shift of   the midline structures.    TTE 4/22/25   1. Left ventricular cavity is normal in size. Left ventricular systolic function is normal with an ejection fraction of 64 % by 3D. There are no regional wall motion abnormalities seen.   2. There is mild (grade 1) left ventricular diastolic dysfunction.   3. Normal right ventricular systolic function.   4. Left atrium is mildly dilated.   5. No pericardial effusion seen.   6. Agitated saline injection was negative for intracardiac shunt.   7. Estimated pulmonary artery systolic pressure is 20 mmHg.   8. The inferior vena cava is normal in size measuring 1.31 cm in diameter, (normal <2.1cm) with normal inspiratory collapse (normal >50%) consistent with normal right atrial pressure (~3, range 0-5mmHg).

## 2025-04-24 NOTE — PROGRESS NOTE ADULT - ASSESSMENT
50 yo M with no known past medical history who presents to the ED with LUE/LLE weakness and numbness c/f CVA, s/p TNK, admitted to MICU for closer monitoring and q1h neurochecks.     =====NEURO=====  #Acute CVA vs. TIA s/p TNK    -presented with LLE weakness and difficulty ambulating. Stroke code called and CTH negative S/p TNK  4/21 at 23:43  -CTH: no acute intracranial pathology, possible mild microangiopathic ischemic changes   -CT perfusion: no evidence of core infarction or penumbra   -CTA head/neck: no significant occlusion or stenosis or vascular abnormality   - Neurochecks q4h   - Neuro following   -c/w ASA 81mg and atorvastatin 80mg QHS  - Repeat CTH 24hrs after TNK administration negative  - MRI brain w/ and w/o patient endorsed claustrophobia as per neuro okay to complete outpatient   - PT/OT following, no need for rehab ambulating without any issues     =====PULMONARY=====  On RA, satting well. No active issues.      =====CARDIOVASCULAR=====  #HTN    -Hypertensive in ED requiring labetalol IVPx2 currently no requiring cardene gtt  - Goal BP <180/105 for at least 24hrs after TNK goal now is <140/90  -still remains hypertensive, will start amlodipine 5mg daily   - TTE w/ bubble study: Left ventricular systolic function is normal with an ejection fraction of 64 %; neg bubble study    =====GI=====  #Diet  -No active issues  -passed speech and swallow   -tolerating diet       =====RENAL/======   No active issues  -voiding without any issues     =====ENDOCRINE=====   No active issues  -A1c 6.3  -no need for sliding scale at this time      =====INFECTIOUS DISEASE=====  No active issues.      =====HEME/ONC/DVT PPX=====  #Microcytic Anemia   Hgb 12.6 w/ MCV 75.8, no evidence of bleeding  - Send iron studies  - Monitor CBC  - Maintain active T&S, transfuse for Hgb < 7    -DVT ppx: hep SQ      =====GOC=====  -full code  -wife involved in care  -plan for possible D/C this evening or tomorrow if BP controlled     48 yo M with no known past medical history who presents to the ED with LUE/LLE weakness and numbness c/f CVA, s/p TNK, admitted to MICU for closer monitoring and q1h neurochecks.     =====NEURO=====  #Acute CVA vs. TIA s/p TNK    -presented with LLE weakness and difficulty ambulating. Stroke code called and CTH negative S/p TNK  4/21 at 23:43  -CTH: no acute intracranial pathology, possible mild microangiopathic ischemic changes   -CT perfusion: no evidence of core infarction or penumbra   -CTA head/neck: no significant occlusion or stenosis or vascular abnormality   - Neurochecks q4h   - Neuro following   -c/w ASA 81mg and atorvastatin 80mg QHS  - Repeat CTH 24hrs after TNK administration negative  - MRI brain w/ and w/o patient endorsed claustrophobia as per neuro okay to complete outpatient- will write script   - PT/OT following, no need for rehab ambulating without any issues     =====PULMONARY=====  On RA, satting well. No active issues.      =====CARDIOVASCULAR=====  #HTN    -Hypertensive in ED requiring labetalol IVPx2 currently no requiring cardene gtt  - Goal BP <180/105 for at least 24hrs after TNK goal now is <140/90  -still remains hypertensive added losartan 25mg daily, cont amlodipine 5mg daily   - TTE w/ bubble study: Left ventricular systolic function is normal with an ejection fraction of 64 %; neg bubble study    =====GI=====  #Diet  -No active issues  -passed speech and swallow   -tolerating diet       =====RENAL/======   No active issues  -voiding without any issues     =====ENDOCRINE=====   No active issues  -A1c 6.3  -no need for sliding scale at this time      =====INFECTIOUS DISEASE=====  No active issues.      =====HEME/ONC/DVT PPX=====  #Microcytic Anemia   Hgb 12.6 w/ MCV 75.8, no evidence of bleeding  - Send iron studies  - Monitor CBC  - Maintain active T&S, transfuse for Hgb < 7    -DVT ppx: hep SQ      =====GOC=====  -full code  -wife involved in care  -plan for possible D/C this evening or tomorrow if BP controlled

## 2025-04-24 NOTE — PROGRESS NOTE ADULT - NS ATTEND AMEND GEN_ALL_CORE FT
50 yo M with no known past medical history who presents to the ED with L arm/leg weakness and numbness that initially began around 6 PM this evening,     Patient presented to the ED s/p code stroke, CTA/ perfusion negative but given symptoms s/p TNK last night around 11pm. Symptoms has since resolved. Passed s/s eval.    # CVA  # Left sided weakness  - Repeat CTH without bleeding or complication. Patient perfers to have MRI as outpatient.   - TTE with bubble study noted   - A1c and lipids noted.   - On ASA and statins.   - PT/OT- no needs  - Passed dysphagia screening  - Per neuro okay with starting BP control, no longer need permissive hypertension. Will start norvasc today.   - f/u with neuro recs  - DVT ppx- Chemical ppx  - Dispo- full code.
48 yo M with no known past medical history who presents to the ED with L arm/leg weakness and numbness that initially began around 6 PM this evening,     Patient presented to the ED s/p code stroke, CTA/ perfusion negative but given symptoms s/p TNK around 11pm on the day of admission. Symptoms has since resolved. Passed s/s eval.    Patient did not tolerate MRI despite precedex. Would want outpatient OPEN MRI  Started on CCB and ARB.     # CVA  # Left sided weakness  - Repeat CTH without bleeding or complication. Patient prefers to have MRI as outpatient.   - TTE with bubble study noted   - A1c and lipids noted.   - On ASA and statins.   - PT/OT- no needs  - Passed dysphagia screening  - Started on norvasc, still with elevated BP, Will start losartan. Patient had home BP will measure at home. Will also need blood work in 1 week for renal function.   - f/u with neuro recs  - DVT ppx- Chemical ppx  - Dispo- full code.

## 2025-04-24 NOTE — PROGRESS NOTE ADULT - SUBJECTIVE AND OBJECTIVE BOX
INTERVAL HPI/OVERNIGHT EVENTS:    HPI:    SUBJECTIVE: Patient seen and examined at bedside.     CONSTITUTIONAL: No weakness, fevers or chills  EYES/ENT: No visual changes;  No vertigo or throat pain   NECK: No pain or stiffness  RESPIRATORY: No cough, wheezing, hemoptysis; No shortness of breath  CARDIOVASCULAR: No chest pain or palpitations  GASTROINTESTINAL: No abdominal or epigastric pain. No nausea, vomiting, or hematemesis; No diarrhea or constipation. No melena or hematochezia.  GENITOURINARY: No dysuria, frequency or hematuria  NEUROLOGICAL: No numbness or weakness  SKIN: No itching, rashes    OBJECTIVE:    VITAL SIGNS:  ICU Vital Signs Last 24 Hrs  T(C): 36.6 (24 Apr 2025 04:00), Max: 36.7 (24 Apr 2025 00:00)  T(F): 97.8 (24 Apr 2025 04:00), Max: 98 (24 Apr 2025 00:00)  HR: 65 (24 Apr 2025 04:00) (64 - 80)  BP: 131/82 (24 Apr 2025 04:00) (131/82 - 164/95)  BP(mean): 97 (24 Apr 2025 04:00) (97 - 119)  ABP: --  ABP(mean): --  RR: 15 (24 Apr 2025 04:00) (14 - 20)  SpO2: 98% (24 Apr 2025 04:00) (95% - 99%)    O2 Parameters below as of 24 Apr 2025 04:00  Patient On (Oxygen Delivery Method): room air              04-22 @ 07:01 - 04-23 @ 07:00  --------------------------------------------------------  IN: 350 mL / OUT: 0 mL / NET: 350 mL    04-23 @ 07:01  -  04-24 @ 06:33  --------------------------------------------------------  IN: 560 mL / OUT: 0 mL / NET: 560 mL      CAPILLARY BLOOD GLUCOSE          PHYSICAL EXAM:    General: NAD  HEENT: NC/AT; PERRL, clear conjunctiva  Neck: supple  Respiratory: CTA b/l  Cardiovascular: +S1/S2; RRR  Abdomen: soft, NT/ND; +BS x4  Extremities: WWP, 2+ peripheral pulses b/l; no LE edema  Skin: normal color and turgor; no rash  Neurological:    MEDICATIONS:  MEDICATIONS  (STANDING):  amLODIPine   Tablet 5 milliGRAM(s) Oral daily  aspirin enteric coated 81 milliGRAM(s) Oral daily  atorvastatin 80 milliGRAM(s) Oral at bedtime  chlorhexidine 2% Cloths 1 Application(s) Topical daily  fluticasone propionate 50 MICROgram(s)/spray Nasal Spray 1 Spray(s) Both Nostrils two times a day  heparin   Injectable 5000 Unit(s) SubCutaneous every 8 hours  influenza   Vaccine 0.5 milliLiter(s) IntraMuscular once    MEDICATIONS  (PRN):      ALLERGIES:  Allergies    No Known Drug Allergies  dust (Unknown)  pollen, grass (Unknown)    Intolerances        LABS:                        12.5   6.22  )-----------( 173      ( 23 Apr 2025 00:25 )             38.0     04-23    137  |  101  |  14  ----------------------------<  191[H]  3.6   |  24  |  0.88    Ca    9.4      23 Apr 2025 00:25  Phos  4.2     04-23  Mg     1.90     04-23    TPro  6.8  /  Alb  4.1  /  TBili  0.5  /  DBili  x   /  AST  20  /  ALT  24  /  AlkPhos  74  04-23      Urinalysis Basic - ( 23 Apr 2025 00:25 )    Color: x / Appearance: x / SG: x / pH: x  Gluc: 191 mg/dL / Ketone: x  / Bili: x / Urobili: x   Blood: x / Protein: x / Nitrite: x   Leuk Esterase: x / RBC: x / WBC x   Sq Epi: x / Non Sq Epi: x / Bacteria: x        RADIOLOGY & ADDITIONAL TESTS: Reviewed. INTERVAL HPI/OVERNIGHT EVENTS: unable to tolerate MRI, BP elevated to 170s     HPI: 48 yo M with no known past medical history who presents to the ED with L arm/leg weakness and numbness that initially began around 6 PM this evening, resolved around 8PM, and then returned suddently around 9:30 PM and pt was unable to ambulate 2/2 to LLE weakness, so wife called EMS. Pt denies headache, neck pain/stiffness, visual changes, chest pain, SOB, difficulty speaking. On exam in the ED pt with L sided pronator drift, and difficulty with finger to nose on the left. Code stroke was called from triage, pt taken directly to CT scan and evaluation by Neuro outside CT. Presentation concerning for stroke. Family history significant for CVA.      CTH w/o acute pathology, CT perfusion w/o evidence of core infarction or penumbra, CTA head/neck no significant occlusion or stenosis or vascular abnormality. S/p TNK at 23:43, s/p IVP Labtalol 10 x2, now on Cardene gtt.      VS: Hypertensive up to 191/98, otherwise afebrile, HR 80s, normal RR, satting 100% on RA.    EKG:  NSR, possible LVF  (22 Apr 2025 00:21)    SUBJECTIVE: Patient seen and examined at bedside.     CONSTITUTIONAL: No weakness, fevers or chills  EYES/ENT: No visual changes;  No vertigo or throat pain   NECK: No pain or stiffness  RESPIRATORY: No cough, wheezing, hemoptysis; No shortness of breath  CARDIOVASCULAR: No chest pain or palpitations  GASTROINTESTINAL: No abdominal or epigastric pain. No nausea, vomiting, or hematemesis; No diarrhea or constipation. No melena or hematochezia.  GENITOURINARY: No dysuria, frequency or hematuria  NEUROLOGICAL: No numbness or weakness  SKIN: No itching, rashes    OBJECTIVE:    VITAL SIGNS:  ICU Vital Signs Last 24 Hrs  T(C): 36.6 (24 Apr 2025 04:00), Max: 36.7 (24 Apr 2025 00:00)  T(F): 97.8 (24 Apr 2025 04:00), Max: 98 (24 Apr 2025 00:00)  HR: 65 (24 Apr 2025 04:00) (64 - 80)  BP: 131/82 (24 Apr 2025 04:00) (131/82 - 164/95)  BP(mean): 97 (24 Apr 2025 04:00) (97 - 119)  ABP: --  ABP(mean): --  RR: 15 (24 Apr 2025 04:00) (14 - 20)  SpO2: 98% (24 Apr 2025 04:00) (95% - 99%)    O2 Parameters below as of 24 Apr 2025 04:00  Patient On (Oxygen Delivery Method): room air        04-22 @ 07:01  -  04-23 @ 07:00  --------------------------------------------------------  IN: 350 mL / OUT: 0 mL / NET: 350 mL    04-23 @ 07:01  -  04-24 @ 06:33  --------------------------------------------------------  IN: 560 mL / OUT: 0 mL / NET: 560 mL      CAPILLARY BLOOD GLUCOSE          PHYSICAL EXAM:    Constitutional:  no acute distress   HEENT: + PERRLA, EOMI, no drainage or redness  Neck: supple,  No JVD  Respiratory: breath Sounds equal & clear bilaterally to auscultation, no accessory muscle use noted  Cardiovascular: Regular rate, regular rhythm, normal S1, S2; no murmurs or rub  Gastrointestinal: Soft, non-tender, non distended, no hepatosplenomegaly, normal bowel sounds  Extremities: no peripheral edema, no cyanosis, no clubbing   Vascular: Equal and normal pulses: 2+ peripheral pulses throughout  Neurological: alert and oriented   Psychiatric: calm   Musculoskeletal: No joint swelling or deformity; no limitation of movement  Skin: warm, dry, well perfused, no rashes    MEDICATIONS:  MEDICATIONS  (STANDING):  amLODIPine   Tablet 5 milliGRAM(s) Oral daily  aspirin enteric coated 81 milliGRAM(s) Oral daily  atorvastatin 80 milliGRAM(s) Oral at bedtime  chlorhexidine 2% Cloths 1 Application(s) Topical daily  fluticasone propionate 50 MICROgram(s)/spray Nasal Spray 1 Spray(s) Both Nostrils two times a day  heparin   Injectable 5000 Unit(s) SubCutaneous every 8 hours  influenza   Vaccine 0.5 milliLiter(s) IntraMuscular once    MEDICATIONS  (PRN):      ALLERGIES:  Allergies    No Known Drug Allergies  dust (Unknown)  pollen, grass (Unknown)    Intolerances        LABS:                        12.5   6.22  )-----------( 173      ( 23 Apr 2025 00:25 )             38.0     04-23    137  |  101  |  14  ----------------------------<  191[H]  3.6   |  24  |  0.88    Ca    9.4      23 Apr 2025 00:25  Phos  4.2     04-23  Mg     1.90     04-23    TPro  6.8  /  Alb  4.1  /  TBili  0.5  /  DBili  x   /  AST  20  /  ALT  24  /  AlkPhos  74  04-23      Urinalysis Basic - ( 23 Apr 2025 00:25 )    Color: x / Appearance: x / SG: x / pH: x  Gluc: 191 mg/dL / Ketone: x  / Bili: x / Urobili: x   Blood: x / Protein: x / Nitrite: x   Leuk Esterase: x / RBC: x / WBC x   Sq Epi: x / Non Sq Epi: x / Bacteria: x        RADIOLOGY & ADDITIONAL TESTS: Reviewed.

## 2025-04-24 NOTE — DISCHARGE NOTE NURSING/CASE MANAGEMENT/SOCIAL WORK - PATIENT PORTAL LINK FT
You can access the FollowMyHealth Patient Portal offered by [facility] by registering at the following website: [email or web address]. By joining [employer]’s FollowMyHealth portal, you will also be able to view your health information using other applications (apps) compatible with our system.

## 2025-07-03 ENCOUNTER — NON-APPOINTMENT (OUTPATIENT)
Age: 50
End: 2025-07-03

## 2025-07-31 PROBLEM — Z00.00 ENCOUNTER FOR PREVENTIVE HEALTH EXAMINATION: Status: ACTIVE | Noted: 2025-07-31

## 2025-08-29 DIAGNOSIS — I10 ESSENTIAL (PRIMARY) HYPERTENSION: ICD-10-CM

## 2025-08-29 RX ORDER — FLUTICASONE PROPIONATE 50 MCG
50 SPRAY, SUSPENSION (ML) NASAL
Refills: 0 | Status: ACTIVE | COMMUNITY

## 2025-08-29 RX ORDER — AMLODIPINE BESYLATE 5 MG/1
5 TABLET ORAL
Qty: 90 | Refills: 3 | Status: ACTIVE | COMMUNITY

## 2025-08-29 RX ORDER — ATORVASTATIN CALCIUM 80 MG/1
80 TABLET, FILM COATED ORAL
Qty: 90 | Refills: 3 | Status: ACTIVE | COMMUNITY

## 2025-08-29 RX ORDER — ASPIRIN 81 MG/1
81 TABLET, CHEWABLE ORAL
Refills: 0 | Status: ACTIVE | COMMUNITY

## 2025-08-29 RX ORDER — LOSARTAN POTASSIUM 25 MG/1
25 TABLET, FILM COATED ORAL
Refills: 0 | Status: ACTIVE | COMMUNITY

## 2025-09-02 ENCOUNTER — APPOINTMENT (OUTPATIENT)
Dept: CARDIOLOGY | Facility: CLINIC | Age: 50
End: 2025-09-02
Payer: COMMERCIAL

## 2025-09-02 ENCOUNTER — APPOINTMENT (OUTPATIENT)
Dept: ELECTROPHYSIOLOGY | Facility: CLINIC | Age: 50
End: 2025-09-02

## 2025-09-02 VITALS
DIASTOLIC BLOOD PRESSURE: 97 MMHG | HEART RATE: 79 BPM | HEIGHT: 67 IN | SYSTOLIC BLOOD PRESSURE: 168 MMHG | OXYGEN SATURATION: 98 % | WEIGHT: 193 LBS | BODY MASS INDEX: 30.29 KG/M2

## 2025-09-02 DIAGNOSIS — Z82.49 FAMILY HISTORY OF ISCHEMIC HEART DISEASE AND OTHER DISEASES OF THE CIRCULATORY SYSTEM: ICD-10-CM

## 2025-09-02 DIAGNOSIS — I63.9 CEREBRAL INFARCTION, UNSPECIFIED: ICD-10-CM

## 2025-09-02 DIAGNOSIS — Z78.9 OTHER SPECIFIED HEALTH STATUS: ICD-10-CM

## 2025-09-02 DIAGNOSIS — Z87.891 PERSONAL HISTORY OF NICOTINE DEPENDENCE: ICD-10-CM

## 2025-09-02 PROCEDURE — 99214 OFFICE O/P EST MOD 30 MIN: CPT | Mod: 25

## 2025-09-02 PROCEDURE — 99204 OFFICE O/P NEW MOD 45 MIN: CPT | Mod: 25

## 2025-09-02 PROCEDURE — 93000 ELECTROCARDIOGRAM COMPLETE: CPT

## 2025-09-02 RX ORDER — MONTELUKAST 10 MG/1
10 TABLET, FILM COATED ORAL
Qty: 90 | Refills: 0 | Status: ACTIVE | COMMUNITY
Start: 2025-04-24

## 2025-09-02 RX ORDER — ALBUTEROL SULFATE 90 UG/1
108 (90 BASE) INHALANT RESPIRATORY (INHALATION)
Qty: 20 | Refills: 0 | Status: ACTIVE | COMMUNITY
Start: 2025-04-24

## 2025-09-02 RX ORDER — OLOPATADINE HYDROCHLORIDE 665 UG/1
0.6 SPRAY, METERED NASAL
Qty: 92 | Refills: 0 | Status: ACTIVE | COMMUNITY
Start: 2025-04-24

## 2025-09-02 RX ORDER — OLOPATADINE HYDROCHLORIDE OPHTHALMIC 1 MG/ML
0.1 SOLUTION OPHTHALMIC
Qty: 5 | Refills: 0 | Status: ACTIVE | COMMUNITY
Start: 2025-04-24

## 2025-09-10 PROCEDURE — 93244 EXT ECG>48HR<7D REV&INTERPJ: CPT

## 2025-09-12 ENCOUNTER — NON-APPOINTMENT (OUTPATIENT)
Age: 50
End: 2025-09-12

## 2025-09-12 ENCOUNTER — APPOINTMENT (OUTPATIENT)
Dept: CV DIAGNOSTICS | Facility: HOSPITAL | Age: 50
End: 2025-09-12

## 2025-09-12 ENCOUNTER — RESULT REVIEW (OUTPATIENT)
Age: 50
End: 2025-09-12

## 2025-09-13 ENCOUNTER — APPOINTMENT (OUTPATIENT)
Dept: CARDIOLOGY | Facility: CLINIC | Age: 50
End: 2025-09-13
Payer: COMMERCIAL

## 2025-09-13 PROCEDURE — 93306 TTE W/DOPPLER COMPLETE: CPT

## 2025-09-15 ENCOUNTER — TRANSCRIPTION ENCOUNTER (OUTPATIENT)
Age: 50
End: 2025-09-15